# Patient Record
Sex: FEMALE | Race: WHITE | Employment: OTHER | URBAN - METROPOLITAN AREA
[De-identification: names, ages, dates, MRNs, and addresses within clinical notes are randomized per-mention and may not be internally consistent; named-entity substitution may affect disease eponyms.]

---

## 2020-01-08 ENCOUNTER — APPOINTMENT (OUTPATIENT)
Dept: GENERAL RADIOLOGY | Age: 50
End: 2020-01-08
Attending: PHYSICIAN ASSISTANT
Payer: COMMERCIAL

## 2020-01-08 ENCOUNTER — HOSPITAL ENCOUNTER (OUTPATIENT)
Age: 50
Discharge: HOME | End: 2020-01-08
Payer: COMMERCIAL

## 2020-01-08 VITALS
TEMPERATURE: 98 F | WEIGHT: 194 LBS | OXYGEN SATURATION: 99 % | SYSTOLIC BLOOD PRESSURE: 140 MMHG | HEART RATE: 79 BPM | DIASTOLIC BLOOD PRESSURE: 80 MMHG | BODY MASS INDEX: 29.4 KG/M2 | HEIGHT: 68 IN | RESPIRATION RATE: 18 BRPM

## 2020-01-08 DIAGNOSIS — M94.0 COSTOCHONDRITIS, ACUTE: Primary | ICD-10-CM

## 2020-01-08 DIAGNOSIS — R07.81 RIB PAIN ON RIGHT SIDE: ICD-10-CM

## 2020-01-08 PROCEDURE — 99204 OFFICE O/P NEW MOD 45 MIN: CPT

## 2020-01-08 PROCEDURE — 71101 X-RAY EXAM UNILAT RIBS/CHEST: CPT | Performed by: PHYSICIAN ASSISTANT

## 2020-01-08 PROCEDURE — 96372 THER/PROPH/DIAG INJ SC/IM: CPT

## 2020-01-08 RX ORDER — HYDROXYCHLOROQUINE SULFATE 200 MG/1
200 TABLET, FILM COATED ORAL 2 TIMES DAILY
COMMUNITY
End: 2020-02-17

## 2020-01-08 RX ORDER — ESTRADIOL AND NORETHINDRONE ACETATE 1; .5 MG/1; MG/1
1 TABLET ORAL DAILY
COMMUNITY
End: 2020-05-27

## 2020-01-08 RX ORDER — HYDROCODONE BITARTRATE AND ACETAMINOPHEN 5; 325 MG/1; MG/1
1-2 TABLET ORAL EVERY 6 HOURS PRN
Qty: 10 TABLET | Refills: 0 | Status: SHIPPED | OUTPATIENT
Start: 2020-01-08 | End: 2020-01-15

## 2020-01-08 RX ORDER — METHOTREXATE 2.5 MG/1
25 TABLET ORAL WEEKLY
COMMUNITY
End: 2020-02-17

## 2020-01-08 RX ORDER — FOLIC ACID 1 MG/1
TABLET ORAL DAILY
COMMUNITY
End: 2020-11-02

## 2020-01-08 RX ORDER — KETOROLAC TROMETHAMINE 30 MG/ML
60 INJECTION, SOLUTION INTRAMUSCULAR; INTRAVENOUS ONCE
Status: COMPLETED | OUTPATIENT
Start: 2020-01-08 | End: 2020-01-08

## 2020-01-08 RX ORDER — MULTIVIT-MIN/IRON/FOLIC ACID/K 18-600-40
CAPSULE ORAL DAILY
COMMUNITY

## 2020-01-08 RX ORDER — NORTRIPTYLINE HYDROCHLORIDE 10 MG/1
10 CAPSULE ORAL NIGHTLY
COMMUNITY
End: 2020-01-23

## 2020-01-08 RX ORDER — DEXAMETHASONE SODIUM PHOSPHATE 4 MG/ML
16 VIAL (ML) INJECTION ONCE
Status: COMPLETED | OUTPATIENT
Start: 2020-01-08 | End: 2020-01-08

## 2020-01-08 RX ORDER — GABAPENTIN 300 MG/1
1200 CAPSULE ORAL NIGHTLY
COMMUNITY
End: 2020-01-23

## 2020-01-08 RX ORDER — METHYLPREDNISOLONE 4 MG/1
TABLET ORAL
Qty: 1 PACKAGE | Refills: 0 | Status: SHIPPED | OUTPATIENT
Start: 2020-01-08 | End: 2020-01-23 | Stop reason: ALTCHOICE

## 2020-01-08 RX ORDER — LEVOTHYROXINE SODIUM 112 UG/1
112 TABLET ORAL
COMMUNITY
End: 2020-05-27

## 2020-01-08 RX ORDER — AZATHIOPRINE 75 MG/1
75 TABLET ORAL DAILY
COMMUNITY
End: 2020-02-17

## 2020-01-08 RX ORDER — GABAPENTIN 300 MG/1
900 CAPSULE ORAL 2 TIMES DAILY
COMMUNITY
End: 2020-01-23

## 2020-01-08 NOTE — ED PROVIDER NOTES
Patient Seen in: [facility]      History   Patient presents with:  Pain    Stated Complaint: Front rib area pain 1 wk    HPI    50-year-old female here with complaint of tenderness palpation to the right rib cage is been on and Vitals signs and nursing note reviewed. Exam conducted with a chaperone present. Constitutional:       Appearance: She is well-developed. HENT:      Head: Normocephalic.       Right Ear: Tympanic membrane and external ear normal.      Left Ear: Tympanic CONCLUSION:  No evidence of acute displaced right rib fracture.     Dictated by: Akbar Cornejo MD on 1/08/2020 at 18:36     Approved by: Akbar Cornejo MD on 1/08/2020 at 18:36           NOTE: Patient has exquisite tenderness to palpation possibly due to

## 2020-01-23 ENCOUNTER — OFFICE VISIT (OUTPATIENT)
Dept: RHEUMATOLOGY | Facility: CLINIC | Age: 50
End: 2020-01-23
Payer: COMMERCIAL

## 2020-01-23 VITALS
RESPIRATION RATE: 18 BRPM | WEIGHT: 194 LBS | DIASTOLIC BLOOD PRESSURE: 80 MMHG | HEART RATE: 84 BPM | BODY MASS INDEX: 29.4 KG/M2 | HEIGHT: 68 IN | SYSTOLIC BLOOD PRESSURE: 138 MMHG

## 2020-01-23 DIAGNOSIS — M32.19 SYSTEMIC LUPUS ERYTHEMATOSUS WITH OTHER ORGAN INVOLVEMENT, UNSPECIFIED SLE TYPE (HCC): Primary | ICD-10-CM

## 2020-01-23 DIAGNOSIS — M35.01 SJOGREN'S SYNDROME WITH KERATOCONJUNCTIVITIS SICCA (HCC): ICD-10-CM

## 2020-01-23 DIAGNOSIS — M79.7 FIBROMYALGIA: ICD-10-CM

## 2020-01-23 DIAGNOSIS — E03.9 ACQUIRED HYPOTHYROIDISM: ICD-10-CM

## 2020-01-23 DIAGNOSIS — E89.0 S/P THYROIDECTOMY: ICD-10-CM

## 2020-01-23 PROBLEM — M32.9 LUPUS (SYSTEMIC LUPUS ERYTHEMATOSUS) (HCC): Status: ACTIVE | Noted: 2020-01-23

## 2020-01-23 PROBLEM — Z90.89 S/P THYROIDECTOMY: Status: ACTIVE | Noted: 2020-01-23

## 2020-01-23 PROBLEM — Z90.13 S/P BILATERAL MASTECTOMY: Status: ACTIVE | Noted: 2020-01-23

## 2020-01-23 PROBLEM — Z98.890 S/P THYROIDECTOMY: Status: ACTIVE | Noted: 2020-01-23

## 2020-01-23 PROCEDURE — 99205 OFFICE O/P NEW HI 60 MIN: CPT | Performed by: INTERNAL MEDICINE

## 2020-01-23 RX ORDER — NORTRIPTYLINE HYDROCHLORIDE 10 MG/1
10 CAPSULE ORAL NIGHTLY
Qty: 90 CAPSULE | Refills: 3 | Status: SHIPPED | OUTPATIENT
Start: 2020-01-23 | End: 2020-05-27

## 2020-01-23 RX ORDER — GABAPENTIN 300 MG/1
CAPSULE ORAL
Qty: 900 CAPSULE | Refills: 3 | Status: SHIPPED | OUTPATIENT
Start: 2020-01-23 | End: 2020-11-02

## 2020-01-23 NOTE — PATIENT INSTRUCTIONS
Current advice do lab tests including routine lab tests CBC and chemistry profile, but specific lupus tests to check to see how active your lupus is. If the lupus is active and labs and will have to adjust your lupus medication.   However if the lupus test

## 2020-01-24 ENCOUNTER — LAB ENCOUNTER (OUTPATIENT)
Dept: LAB | Age: 50
End: 2020-01-24
Attending: INTERNAL MEDICINE
Payer: COMMERCIAL

## 2020-01-24 ENCOUNTER — OFFICE VISIT (OUTPATIENT)
Dept: INTERNAL MEDICINE CLINIC | Facility: CLINIC | Age: 50
End: 2020-01-24
Payer: COMMERCIAL

## 2020-01-24 VITALS
DIASTOLIC BLOOD PRESSURE: 62 MMHG | HEIGHT: 67.5 IN | OXYGEN SATURATION: 99 % | TEMPERATURE: 98 F | WEIGHT: 194 LBS | BODY MASS INDEX: 30.09 KG/M2 | HEART RATE: 82 BPM | RESPIRATION RATE: 16 BRPM | SYSTOLIC BLOOD PRESSURE: 110 MMHG

## 2020-01-24 DIAGNOSIS — Z15.02 BRCA GENE MUTATION POSITIVE IN FEMALE: ICD-10-CM

## 2020-01-24 DIAGNOSIS — Z79.890 MENOPAUSAL SYNDROME ON HORMONE REPLACEMENT THERAPY: ICD-10-CM

## 2020-01-24 DIAGNOSIS — R22.32 LUMP IN ARMPIT, LEFT: ICD-10-CM

## 2020-01-24 DIAGNOSIS — Z15.09 BRCA GENE MUTATION POSITIVE IN FEMALE: ICD-10-CM

## 2020-01-24 DIAGNOSIS — Z15.01 BRCA GENE MUTATION POSITIVE IN FEMALE: ICD-10-CM

## 2020-01-24 DIAGNOSIS — E03.9 ACQUIRED HYPOTHYROIDISM: ICD-10-CM

## 2020-01-24 DIAGNOSIS — M32.8 OTHER FORMS OF SYSTEMIC LUPUS ERYTHEMATOSUS, UNSPECIFIED ORGAN INVOLVEMENT STATUS (HCC): Primary | ICD-10-CM

## 2020-01-24 DIAGNOSIS — I43 DILATED CARDIOMYOPATHY SECONDARY TO SYSTEMIC LUPUS ERYTHEMATOSUS (HCC): Primary | ICD-10-CM

## 2020-01-24 DIAGNOSIS — Z90.13 S/P BILATERAL MASTECTOMY: ICD-10-CM

## 2020-01-24 DIAGNOSIS — M32.19 SYSTEMIC LUPUS ERYTHEMATOSUS WITH OTHER ORGAN INVOLVEMENT, UNSPECIFIED SLE TYPE (HCC): ICD-10-CM

## 2020-01-24 DIAGNOSIS — M79.7 FIBROMYALGIA: ICD-10-CM

## 2020-01-24 DIAGNOSIS — M32.19 DILATED CARDIOMYOPATHY SECONDARY TO SYSTEMIC LUPUS ERYTHEMATOSUS (HCC): Primary | ICD-10-CM

## 2020-01-24 DIAGNOSIS — N95.1 MENOPAUSAL SYNDROME ON HORMONE REPLACEMENT THERAPY: ICD-10-CM

## 2020-01-24 LAB
ALBUMIN SERPL-MCNC: 3.7 G/DL (ref 3.4–5)
ALBUMIN/GLOB SERPL: 1.2 {RATIO} (ref 1–2)
ALP LIVER SERPL-CCNC: 64 U/L (ref 39–100)
ALT SERPL-CCNC: 24 U/L (ref 13–56)
ANION GAP SERPL CALC-SCNC: 3 MMOL/L (ref 0–18)
AST SERPL-CCNC: 21 U/L (ref 15–37)
BASOPHILS # BLD AUTO: 0.04 X10(3) UL (ref 0–0.2)
BASOPHILS NFR BLD AUTO: 1.5 %
BILIRUB SERPL-MCNC: 0.7 MG/DL (ref 0.1–2)
BUN BLD-MCNC: 11 MG/DL (ref 7–18)
BUN/CREAT SERPL: 12.8 (ref 10–20)
C4 SERPL-MCNC: 23 MG/DL (ref 10–40)
CALCIUM BLD-MCNC: 8.5 MG/DL (ref 8.5–10.1)
CHLORIDE SERPL-SCNC: 113 MMOL/L (ref 98–112)
CHOLEST SMN-MCNC: 195 MG/DL (ref ?–200)
CO2 SERPL-SCNC: 27 MMOL/L (ref 21–32)
CREAT BLD-MCNC: 0.86 MG/DL (ref 0.55–1.02)
CRP SERPL-MCNC: <0.29 MG/DL (ref ?–0.3)
DEPRECATED RDW RBC AUTO: 51 FL (ref 35.1–46.3)
EOSINOPHIL # BLD AUTO: 0.12 X10(3) UL (ref 0–0.7)
EOSINOPHIL NFR BLD AUTO: 4.4 %
ERYTHROCYTE [DISTWIDTH] IN BLOOD BY AUTOMATED COUNT: 13.3 % (ref 11–15)
GLOBULIN PLAS-MCNC: 3.2 G/DL (ref 2.8–4.4)
GLUCOSE BLD-MCNC: 85 MG/DL (ref 70–99)
HCT VFR BLD AUTO: 37.2 % (ref 35–48)
HDLC SERPL-MCNC: 72 MG/DL (ref 40–59)
HGB BLD-MCNC: 11.9 G/DL (ref 12–16)
IMM GRANULOCYTES # BLD AUTO: 0 X10(3) UL (ref 0–1)
IMM GRANULOCYTES NFR BLD: 0 %
LDLC SERPL CALC-MCNC: 108 MG/DL (ref ?–100)
LYMPHOCYTES # BLD AUTO: 1.29 X10(3) UL (ref 1–4)
LYMPHOCYTES NFR BLD AUTO: 47.6 %
M PROTEIN MFR SERPL ELPH: 6.9 G/DL (ref 6.4–8.2)
MCH RBC QN AUTO: 33.1 PG (ref 26–34)
MCHC RBC AUTO-ENTMCNC: 32 G/DL (ref 31–37)
MCV RBC AUTO: 103.6 FL (ref 80–100)
MONOCYTES # BLD AUTO: 0.31 X10(3) UL (ref 0.1–1)
MONOCYTES NFR BLD AUTO: 11.4 %
NEUTROPHILS # BLD AUTO: 0.95 X10 (3) UL (ref 1.5–7.7)
NEUTROPHILS # BLD AUTO: 0.95 X10(3) UL (ref 1.5–7.7)
NEUTROPHILS NFR BLD AUTO: 35.1 %
NONHDLC SERPL-MCNC: 123 MG/DL (ref ?–130)
OSMOLALITY SERPL CALC.SUM OF ELEC: 295 MOSM/KG (ref 275–295)
PATIENT FASTING Y/N/NP: YES
PATIENT FASTING Y/N/NP: YES
PLATELET # BLD AUTO: 157 10(3)UL (ref 150–450)
POTASSIUM SERPL-SCNC: 4.2 MMOL/L (ref 3.5–5.1)
RBC # BLD AUTO: 3.59 X10(6)UL (ref 3.8–5.3)
SED RATE-ML: 11 MM/HR (ref 0–25)
SODIUM SERPL-SCNC: 143 MMOL/L (ref 136–145)
T4 FREE SERPL-MCNC: 1.3 NG/DL (ref 0.8–1.7)
TRIGL SERPL-MCNC: 75 MG/DL (ref 30–149)
TSI SER-ACNC: 3.04 MIU/ML (ref 0.36–3.74)
VLDLC SERPL CALC-MCNC: 15 MG/DL (ref 0–30)
WBC # BLD AUTO: 2.7 X10(3) UL (ref 4–11)

## 2020-01-24 PROCEDURE — 86038 ANTINUCLEAR ANTIBODIES: CPT | Performed by: INTERNAL MEDICINE

## 2020-01-24 PROCEDURE — 80061 LIPID PANEL: CPT | Performed by: INTERNAL MEDICINE

## 2020-01-24 PROCEDURE — 86235 NUCLEAR ANTIGEN ANTIBODY: CPT | Performed by: INTERNAL MEDICINE

## 2020-01-24 PROCEDURE — 99204 OFFICE O/P NEW MOD 45 MIN: CPT | Performed by: INTERNAL MEDICINE

## 2020-01-24 PROCEDURE — 85652 RBC SED RATE AUTOMATED: CPT | Performed by: INTERNAL MEDICINE

## 2020-01-24 PROCEDURE — 86140 C-REACTIVE PROTEIN: CPT | Performed by: INTERNAL MEDICINE

## 2020-01-24 PROCEDURE — 36415 COLL VENOUS BLD VENIPUNCTURE: CPT | Performed by: INTERNAL MEDICINE

## 2020-01-24 PROCEDURE — 84439 ASSAY OF FREE THYROXINE: CPT | Performed by: INTERNAL MEDICINE

## 2020-01-24 PROCEDURE — 80050 GENERAL HEALTH PANEL: CPT | Performed by: INTERNAL MEDICINE

## 2020-01-24 PROCEDURE — 86225 DNA ANTIBODY NATIVE: CPT | Performed by: INTERNAL MEDICINE

## 2020-01-24 PROCEDURE — 86160 COMPLEMENT ANTIGEN: CPT | Performed by: INTERNAL MEDICINE

## 2020-01-24 NOTE — PATIENT INSTRUCTIONS
If you are going to be on naproxen every day, then you need to start omeprazole, pantoprazole, or esomeprazole 30 minutes prior to breakfast every day to protect your stomach.

## 2020-01-24 NOTE — PROGRESS NOTES
Lauren Mares is a 47year old female. HPI:   Patient presents for the following issues. Moved here from [place]. 1. Fibromyalgia: on gabapentin and notryptiline but has joint pain every day. Not sure how much medication is helping.   2. Migraines: has OTHER (SEE COMMENTS)    Comment:Blister  Ultram [Tramadol]       ITCHING    No family history on file.    Past Medical History:   Diagnosis Date   • Fibromyalgia    • Fibromyalgia    • Lupus ([facility]    • Lupus ([facility]    • Sjogren's syndrome ([facility]    • Sjogr cont levothyroxine. TSH ordered  # Lupus: she has established with rheumatology, Dr. Ankita Amador. She is currently on HCQ, MTX, and azathioprine. # Pain: not sure if she is having lupus flare vs fibromyalgia worsening. Labs are processing for further advice.

## 2020-01-25 ENCOUNTER — HOSPITAL ENCOUNTER (OUTPATIENT)
Dept: ULTRASOUND IMAGING | Age: 50
Discharge: HOME | End: 2020-01-25
Attending: INTERNAL MEDICINE
Payer: COMMERCIAL

## 2020-01-25 DIAGNOSIS — R22.32 LUMP IN ARMPIT, LEFT: ICD-10-CM

## 2020-01-25 PROCEDURE — 76882 US LMTD JT/FCL EVL NVASC XTR: CPT | Performed by: INTERNAL MEDICINE

## 2020-01-27 LAB
ANA SCREEN: POSITIVE
CENTROMERE AUTOAB: <100 AU/ML (ref ?–100)
DSDNA AUTOAB: <100 IU/ML (ref ?–100)
HISTONE AUTOAB: <100 AU/ML (ref ?–100)
JO-1 AUTOAB: <100 AU/ML (ref ?–100)
RNP AUTOAB: <100 AU/ML (ref ?–100)
SCL-70 AUTOAB: <100 AU/ML (ref ?–100)
SM AUTOAB (SMITH): <100 AU/ML (ref ?–100)
SSA AUTOAB: 166 AU/ML (ref ?–100)
SSB AUTOAB: <100 AU/ML (ref ?–100)

## 2020-05-20 ENCOUNTER — LAB ENCOUNTER (OUTPATIENT)
Dept: LAB | Age: 50
End: 2020-05-20
Attending: INTERNAL MEDICINE
Payer: COMMERCIAL

## 2020-05-20 DIAGNOSIS — M35.01 SJOGREN'S SYNDROME WITH KERATOCONJUNCTIVITIS SICCA (HCC): ICD-10-CM

## 2020-05-20 DIAGNOSIS — M32.19 SYSTEMIC LUPUS ERYTHEMATOSUS WITH OTHER ORGAN INVOLVEMENT, UNSPECIFIED SLE TYPE (HCC): ICD-10-CM

## 2020-05-20 DIAGNOSIS — R79.89 ABNORMAL CBC: ICD-10-CM

## 2020-05-20 DIAGNOSIS — M79.7 FIBROMYALGIA: ICD-10-CM

## 2020-05-20 DIAGNOSIS — Z79.899 LONG-TERM USE OF HIGH-RISK MEDICATION: ICD-10-CM

## 2020-05-20 PROCEDURE — 80076 HEPATIC FUNCTION PANEL: CPT

## 2020-05-20 PROCEDURE — 81003 URINALYSIS AUTO W/O SCOPE: CPT

## 2020-05-20 PROCEDURE — 82565 ASSAY OF CREATININE: CPT

## 2020-05-20 PROCEDURE — 86160 COMPLEMENT ANTIGEN: CPT

## 2020-05-20 PROCEDURE — 85025 COMPLETE CBC W/AUTO DIFF WBC: CPT

## 2020-05-20 PROCEDURE — 86256 FLUORESCENT ANTIBODY TITER: CPT

## 2020-05-20 PROCEDURE — 84550 ASSAY OF BLOOD/URIC ACID: CPT

## 2020-05-20 PROCEDURE — 85652 RBC SED RATE AUTOMATED: CPT

## 2020-05-20 PROCEDURE — 86140 C-REACTIVE PROTEIN: CPT

## 2020-05-20 PROCEDURE — 84520 ASSAY OF UREA NITROGEN: CPT

## 2020-05-20 PROCEDURE — 82550 ASSAY OF CK (CPK): CPT

## 2020-05-20 PROCEDURE — 36415 COLL VENOUS BLD VENIPUNCTURE: CPT

## 2020-05-27 ENCOUNTER — OFFICE VISIT (OUTPATIENT)
Dept: INTERNAL MEDICINE CLINIC | Facility: CLINIC | Age: 50
End: 2020-05-27
Payer: COMMERCIAL

## 2020-05-27 VITALS
SYSTOLIC BLOOD PRESSURE: 106 MMHG | HEART RATE: 78 BPM | RESPIRATION RATE: 16 BRPM | BODY MASS INDEX: 28.64 KG/M2 | WEIGHT: 189 LBS | TEMPERATURE: 99 F | HEIGHT: 68 IN | DIASTOLIC BLOOD PRESSURE: 74 MMHG

## 2020-05-27 DIAGNOSIS — Z15.02 BRCA GENE MUTATION POSITIVE IN FEMALE: ICD-10-CM

## 2020-05-27 DIAGNOSIS — N95.1 MENOPAUSAL SYNDROME ON HORMONE REPLACEMENT THERAPY: ICD-10-CM

## 2020-05-27 DIAGNOSIS — Z90.13 S/P BILATERAL MASTECTOMY: ICD-10-CM

## 2020-05-27 DIAGNOSIS — Z12.11 SCREEN FOR COLON CANCER: ICD-10-CM

## 2020-05-27 DIAGNOSIS — Z15.09 BRCA GENE MUTATION POSITIVE IN FEMALE: ICD-10-CM

## 2020-05-27 DIAGNOSIS — Z79.890 MENOPAUSAL SYNDROME ON HORMONE REPLACEMENT THERAPY: ICD-10-CM

## 2020-05-27 DIAGNOSIS — G43.709 CHRONIC MIGRAINE WITHOUT AURA WITHOUT STATUS MIGRAINOSUS, NOT INTRACTABLE: ICD-10-CM

## 2020-05-27 DIAGNOSIS — M32.8 OTHER FORMS OF SYSTEMIC LUPUS ERYTHEMATOSUS, UNSPECIFIED ORGAN INVOLVEMENT STATUS (HCC): ICD-10-CM

## 2020-05-27 DIAGNOSIS — Z12.83 SCREENING EXAM FOR SKIN CANCER: ICD-10-CM

## 2020-05-27 DIAGNOSIS — E03.9 ACQUIRED HYPOTHYROIDISM: ICD-10-CM

## 2020-05-27 DIAGNOSIS — Z00.00 ROUTINE GENERAL MEDICAL EXAMINATION AT A HEALTH CARE FACILITY: Primary | ICD-10-CM

## 2020-05-27 DIAGNOSIS — Z15.01 BRCA GENE MUTATION POSITIVE IN FEMALE: ICD-10-CM

## 2020-05-27 DIAGNOSIS — M79.7 FIBROMYALGIA: ICD-10-CM

## 2020-05-27 PROCEDURE — 90472 IMMUNIZATION ADMIN EACH ADD: CPT | Performed by: INTERNAL MEDICINE

## 2020-05-27 PROCEDURE — 88175 CYTOPATH C/V AUTO FLUID REDO: CPT | Performed by: INTERNAL MEDICINE

## 2020-05-27 PROCEDURE — 90732 PPSV23 VACC 2 YRS+ SUBQ/IM: CPT | Performed by: INTERNAL MEDICINE

## 2020-05-27 PROCEDURE — 90750 HZV VACC RECOMBINANT IM: CPT | Performed by: INTERNAL MEDICINE

## 2020-05-27 PROCEDURE — 99214 OFFICE O/P EST MOD 30 MIN: CPT | Performed by: INTERNAL MEDICINE

## 2020-05-27 PROCEDURE — 99396 PREV VISIT EST AGE 40-64: CPT | Performed by: INTERNAL MEDICINE

## 2020-05-27 PROCEDURE — 90471 IMMUNIZATION ADMIN: CPT | Performed by: INTERNAL MEDICINE

## 2020-05-27 PROCEDURE — 90715 TDAP VACCINE 7 YRS/> IM: CPT | Performed by: INTERNAL MEDICINE

## 2020-05-27 RX ORDER — ESTRADIOL AND NORETHINDRONE ACETATE .5; .1 MG/1; MG/1
1 TABLET ORAL DAILY
Qty: 90 TABLET | Refills: 1 | Status: SHIPPED | OUTPATIENT
Start: 2020-05-27 | End: 2020-11-06

## 2020-05-27 RX ORDER — LEVOTHYROXINE SODIUM 112 UG/1
112 TABLET ORAL
Qty: 90 TABLET | Refills: 2 | Status: SHIPPED | OUTPATIENT
Start: 2020-05-27 | End: 2020-05-27

## 2020-05-27 RX ORDER — LEVOTHYROXINE SODIUM 112 UG/1
112 TABLET ORAL
Qty: 90 TABLET | Refills: 2 | Status: SHIPPED | OUTPATIENT
Start: 2020-05-27 | End: 2020-10-30

## 2020-05-27 RX ORDER — ESTRADIOL AND NORETHINDRONE ACETATE .5; .1 MG/1; MG/1
1 TABLET ORAL DAILY
COMMUNITY
End: 2020-05-27

## 2020-05-27 NOTE — PATIENT INSTRUCTIONS
You are due for routine labs in late July, 2020 for Dr. Nisha Retana    You need 3 eight ounce servings of calcium/vitamin D daily. This includes spinach, kale, broccoli, almonds, milk, yogurt, or cottage cheese.

## 2020-05-28 ENCOUNTER — TELEPHONE (OUTPATIENT)
Dept: INTERNAL MEDICINE CLINIC | Facility: CLINIC | Age: 50
End: 2020-05-28

## 2020-05-28 NOTE — TELEPHONE ENCOUNTER
TY. Though it is rare, her body's immune system might be responding to the vaccines as it boosts itself. Symptoms can last 48-72 hours.  Appropriate to use tylenol/ibuprofen and rest.

## 2020-05-28 NOTE — TELEPHONE ENCOUNTER
Pt received 3 vaccines yesterday. Pneumovax 23, TDAP, shingrix and believes she is having reaction to vaccines. States that last night her temp was 99.2 prior to bed, she woke up in the middle of the night to temp of 102 and then this AM temp of 99.6.

## 2020-06-11 ENCOUNTER — GENETICS ENCOUNTER (OUTPATIENT)
Dept: GENETICS | Facility: HOSPITAL | Age: 50
End: 2020-06-11
Attending: GENETIC COUNSELOR, MS
Payer: COMMERCIAL

## 2020-06-11 DIAGNOSIS — Z15.09 BRCA GENE MUTATION POSITIVE IN FEMALE: ICD-10-CM

## 2020-06-11 DIAGNOSIS — Z15.01 BRCA GENE MUTATION POSITIVE IN FEMALE: ICD-10-CM

## 2020-06-11 DIAGNOSIS — Z15.02 BRCA GENE MUTATION POSITIVE IN FEMALE: ICD-10-CM

## 2020-06-11 PROCEDURE — 96040 HC GENETIC COUNSELING EA 30 MIN: CPT | Performed by: GENETIC COUNSELOR, MS

## 2020-06-12 NOTE — PROGRESS NOTES
Referring Provider:  Marek Coburn MD    Additional Provider(s):  Aleta Johnson MD    Reason for Referral:  Jodi Nyhan was referred for genetic counseling because she is a known carrier of a BRCA1 pathogenic variant.   Ms. Nannette Fontanez is a 47year-old woman of Po cancer in her mid-50s and  from “bone” cancer at age 75. The daughter of this aunt  at age 52 from ovarian cancer. A second maternal aunt, Thien Sadler, had breast cancer at age 39. Thien Sadler has one son and three daughters.   One of Fred’s daughters had b y.  • Clinical breast exam, every 6-12 mo, starting at age 22 y. • Breast screening  o Age 20-27 y, annual breast MRI screening with contrast or individualized based on earliest age of onset in family if a breast cancer diagnosis before age 27 is present. surgery. • Although of uncertain benefit, for those patients who have not elected risk-reducing salpingo-oophorectomy, consider concurrent transvaginal ultrasound with serum CA-125, every 6 mo starting at age 32-31 at the clinician's discretion.     • Con Guidelines.  Reasons for young men to consider testing for a familial BRCA1 pathogenic variant prior to age 28 include reproductive planning (e.g, exploration of IVF/PGD for individuals who wish to reduce the chance to pass a pathogenic variant on to their and locate a copy of her genetic test report from 2014 and forward that to me for review and to include in her medical record with ALIZA and PHOEBEG.       Medical recommendations for individuals with BRCA1 pathogenic variants as outlined by NCCN Guidelines v1.20

## 2020-08-13 ENCOUNTER — LAB ENCOUNTER (OUTPATIENT)
Dept: LAB | Age: 50
End: 2020-08-13
Attending: INTERNAL MEDICINE
Payer: COMMERCIAL

## 2020-08-13 DIAGNOSIS — M32.19 SYSTEMIC LUPUS ERYTHEMATOSUS WITH OTHER ORGAN INVOLVEMENT, UNSPECIFIED SLE TYPE (HCC): ICD-10-CM

## 2020-08-13 DIAGNOSIS — Z00.00 ROUTINE GENERAL MEDICAL EXAMINATION AT A HEALTH CARE FACILITY: ICD-10-CM

## 2020-08-13 DIAGNOSIS — Z12.83 SCREENING EXAM FOR SKIN CANCER: ICD-10-CM

## 2020-08-13 DIAGNOSIS — M32.8 OTHER FORMS OF SYSTEMIC LUPUS ERYTHEMATOSUS, UNSPECIFIED ORGAN INVOLVEMENT STATUS (HCC): ICD-10-CM

## 2020-08-13 DIAGNOSIS — D53.9 MACROCYTIC ANEMIA: Primary | ICD-10-CM

## 2020-08-13 DIAGNOSIS — D53.9 MACROCYTIC ANEMIA: ICD-10-CM

## 2020-08-13 LAB
ALBUMIN SERPL-MCNC: 3.8 G/DL (ref 3.4–5)
ALBUMIN/GLOB SERPL: 1.2 {RATIO} (ref 1–2)
ALP LIVER SERPL-CCNC: 80 U/L (ref 39–100)
ALT SERPL-CCNC: 31 U/L (ref 13–56)
ANION GAP SERPL CALC-SCNC: 2 MMOL/L (ref 0–18)
AST SERPL-CCNC: 21 U/L (ref 15–37)
BASOPHILS # BLD AUTO: 0.02 X10(3) UL (ref 0–0.2)
BASOPHILS NFR BLD AUTO: 0.9 %
BILIRUB DIRECT SERPL-MCNC: <0.1 MG/DL (ref 0–0.2)
BILIRUB SERPL-MCNC: 0.4 MG/DL (ref 0.1–2)
BUN BLD-MCNC: 11 MG/DL (ref 7–18)
BUN/CREAT SERPL: 11.5 (ref 10–20)
CALCIUM BLD-MCNC: 8.6 MG/DL (ref 8.5–10.1)
CHLORIDE SERPL-SCNC: 108 MMOL/L (ref 98–112)
CK SERPL-CCNC: 117 U/L (ref 26–192)
CO2 SERPL-SCNC: 31 MMOL/L (ref 21–32)
CREAT BLD-MCNC: 0.96 MG/DL (ref 0.55–1.02)
CRP SERPL-MCNC: <0.29 MG/DL (ref ?–0.3)
DEPRECATED HBV CORE AB SER IA-ACNC: 78.3 NG/ML (ref 18–340)
DEPRECATED RDW RBC AUTO: 49.5 FL (ref 35.1–46.3)
EOSINOPHIL # BLD AUTO: 0.11 X10(3) UL (ref 0–0.7)
EOSINOPHIL NFR BLD AUTO: 5.1 %
ERYTHROCYTE [DISTWIDTH] IN BLOOD BY AUTOMATED COUNT: 13 % (ref 11–15)
GLOBULIN PLAS-MCNC: 3.1 G/DL (ref 2.8–4.4)
GLUCOSE BLD-MCNC: 80 MG/DL (ref 70–99)
HCT VFR BLD AUTO: 35.1 % (ref 35–48)
HGB BLD-MCNC: 11.6 G/DL (ref 12–16)
IMM GRANULOCYTES # BLD AUTO: 0.01 X10(3) UL (ref 0–1)
IMM GRANULOCYTES NFR BLD: 0.5 %
IRON SATURATION: 47 % (ref 15–50)
IRON SERPL-MCNC: 160 UG/DL (ref 50–170)
LYMPHOCYTES # BLD AUTO: 0.81 X10(3) UL (ref 1–4)
LYMPHOCYTES NFR BLD AUTO: 37.3 %
M PROTEIN MFR SERPL ELPH: 6.9 G/DL (ref 6.4–8.2)
MCH RBC QN AUTO: 34.9 PG (ref 26–34)
MCHC RBC AUTO-ENTMCNC: 33 G/DL (ref 31–37)
MCV RBC AUTO: 105.7 FL (ref 80–100)
MONOCYTES # BLD AUTO: 0.21 X10(3) UL (ref 0.1–1)
MONOCYTES NFR BLD AUTO: 9.7 %
NEUTROPHILS # BLD AUTO: 1.01 X10 (3) UL (ref 1.5–7.7)
NEUTROPHILS # BLD AUTO: 1.01 X10(3) UL (ref 1.5–7.7)
NEUTROPHILS NFR BLD AUTO: 46.5 %
OSMOLALITY SERPL CALC.SUM OF ELEC: 290 MOSM/KG (ref 275–295)
PATIENT FASTING Y/N/NP: YES
PLATELET # BLD AUTO: 194 10(3)UL (ref 150–450)
POTASSIUM SERPL-SCNC: 3.7 MMOL/L (ref 3.5–5.1)
RBC # BLD AUTO: 3.32 X10(6)UL (ref 3.8–5.3)
SED RATE-ML: 30 MM/HR (ref 0–25)
SODIUM SERPL-SCNC: 141 MMOL/L (ref 136–145)
TOTAL IRON BINDING CAPACITY: 343 UG/DL (ref 240–450)
TRANSFERRIN SERPL-MCNC: 230 MG/DL (ref 200–360)
URATE SERPL-MCNC: 3.3 MG/DL (ref 2.6–6)
VIT B12 SERPL-MCNC: 394 PG/ML (ref 193–986)
VIT D+METAB SERPL-MCNC: 53.6 NG/ML (ref 30–100)
WBC # BLD AUTO: 2.2 X10(3) UL (ref 4–11)

## 2020-08-13 PROCEDURE — 83550 IRON BINDING TEST: CPT | Performed by: INTERNAL MEDICINE

## 2020-08-13 PROCEDURE — 82607 VITAMIN B-12: CPT | Performed by: INTERNAL MEDICINE

## 2020-08-13 PROCEDURE — 80053 COMPREHEN METABOLIC PANEL: CPT | Performed by: INTERNAL MEDICINE

## 2020-08-13 PROCEDURE — 82728 ASSAY OF FERRITIN: CPT | Performed by: INTERNAL MEDICINE

## 2020-08-13 PROCEDURE — 85025 COMPLETE CBC W/AUTO DIFF WBC: CPT | Performed by: INTERNAL MEDICINE

## 2020-08-13 PROCEDURE — 83540 ASSAY OF IRON: CPT | Performed by: INTERNAL MEDICINE

## 2020-09-12 ENCOUNTER — APPOINTMENT (OUTPATIENT)
Dept: LAB | Facility: HOSPITAL | Age: 50
End: 2020-09-12
Attending: INTERNAL MEDICINE
Payer: COMMERCIAL

## 2020-09-12 DIAGNOSIS — Z01.818 PRE-OP TESTING: ICD-10-CM

## 2020-09-13 LAB — SARS-COV-2 RNA RESP QL NAA+PROBE: NOT DETECTED

## 2020-09-15 PROBLEM — D12.2 BENIGN NEOPLASM OF ASCENDING COLON: Status: ACTIVE | Noted: 2020-09-15

## 2020-09-15 PROBLEM — Z83.71 FAMILY HISTORY OF COLONIC POLYPS: Status: ACTIVE | Noted: 2020-09-15

## 2020-09-15 PROBLEM — Z83.719 FAMILY HISTORY OF COLONIC POLYPS: Status: ACTIVE | Noted: 2020-09-15

## 2020-09-15 PROBLEM — Z12.11 SPECIAL SCREENING FOR MALIGNANT NEOPLASMS, COLON: Status: ACTIVE | Noted: 2020-09-15

## 2020-09-23 RX ORDER — GALCANEZUMAB 120 MG/ML
INJECTION, SOLUTION SUBCUTANEOUS
Refills: 2 | OUTPATIENT
Start: 2020-09-23

## 2020-09-24 RX ORDER — GALCANEZUMAB 120 MG/ML
120 INJECTION, SOLUTION SUBCUTANEOUS
Qty: 1 ML | Refills: 2 | Status: SHIPPED | OUTPATIENT
Start: 2020-09-24 | End: 2020-12-22

## 2020-09-24 NOTE — TELEPHONE ENCOUNTER
Refill requested:   Requested Prescriptions     Pending Prescriptions Disp Refills   • EMGALITY 120 MG/ML Subcutaneous Solution Auto-injector [Pharmacy Med Name: EMGALITY 120 MG/ML PEN]  2     Sig: INJECT 120 MG INTO THE SKIN EVERY 30 (THIRTY) DAYS.      Stacie Willett

## 2020-09-28 ENCOUNTER — LAB ENCOUNTER (OUTPATIENT)
Dept: LAB | Age: 50
End: 2020-09-28
Attending: INTERNAL MEDICINE
Payer: COMMERCIAL

## 2020-09-28 DIAGNOSIS — D64.9 NORMOCYTIC ANEMIA: ICD-10-CM

## 2020-09-28 DIAGNOSIS — D53.9 MACROCYTIC ANEMIA: ICD-10-CM

## 2020-09-28 LAB
FOLATE SERPL-MCNC: 17.4 NG/ML (ref 8.7–?)
VIT B12 SERPL-MCNC: 529 PG/ML (ref 193–986)

## 2020-09-28 PROCEDURE — 82746 ASSAY OF FOLIC ACID SERUM: CPT | Performed by: INTERNAL MEDICINE

## 2020-09-28 PROCEDURE — 82607 VITAMIN B-12: CPT | Performed by: INTERNAL MEDICINE

## 2020-10-30 RX ORDER — LEVOTHYROXINE SODIUM 112 UG/1
112 TABLET ORAL
Qty: 90 TABLET | Refills: 0 | Status: SHIPPED | OUTPATIENT
Start: 2020-10-30 | End: 2021-01-22

## 2020-11-06 RX ORDER — ESTRADIOL AND NORETHINDRONE ACETATE .5; .1 MG/1; MG/1
TABLET ORAL
Qty: 84 TABLET | Refills: 0 | Status: SHIPPED | OUTPATIENT
Start: 2020-11-06 | End: 2021-02-23

## 2020-11-08 ENCOUNTER — HOSPITAL ENCOUNTER (OUTPATIENT)
Age: 50
Discharge: HOME | End: 2020-11-08
Payer: COMMERCIAL

## 2020-11-08 VITALS
RESPIRATION RATE: 18 BRPM | HEART RATE: 79 BPM | SYSTOLIC BLOOD PRESSURE: 128 MMHG | DIASTOLIC BLOOD PRESSURE: 84 MMHG | TEMPERATURE: 97 F | OXYGEN SATURATION: 99 %

## 2020-11-08 DIAGNOSIS — Z20.822 EXPOSURE TO COVID-19 VIRUS: Primary | ICD-10-CM

## 2020-11-08 PROCEDURE — 99213 OFFICE O/P EST LOW 20 MIN: CPT | Performed by: NURSE PRACTITIONER

## 2020-11-08 NOTE — ED PROVIDER NOTES
Patient Seen in: Immediate [address] [place] [address]      History   Patient presents with:  Testing    Stated Complaint: covid exposure-sinus drainage,sore throat    43-year-old female presents to the immediate care requesting a COVID-19 test.  States her  was reviewed with patient/caregiver and is not pertinent to presenting problem.     Social History    Tobacco Use      Smoking status: Never Smoker      Smokeless tobacco: Never Used    Alcohol use: Never      Frequency: Never    Drug use: Never             Rev instructions and agrees to the following plan provided.   The patient and/or family was also given written discharge instructions including information regarding today's visit and indications prompting immediate return and appropriate follow-up was given in

## 2020-11-13 ENCOUNTER — LAB ENCOUNTER (OUTPATIENT)
Dept: LAB | Age: 50
End: 2020-11-13
Attending: INTERNAL MEDICINE
Payer: COMMERCIAL

## 2020-11-13 DIAGNOSIS — M35.01 SJOGREN'S SYNDROME WITH KERATOCONJUNCTIVITIS SICCA (HCC): ICD-10-CM

## 2020-11-13 DIAGNOSIS — M79.7 FIBROMYALGIA: ICD-10-CM

## 2020-11-13 DIAGNOSIS — M32.8 OTHER FORMS OF SYSTEMIC LUPUS ERYTHEMATOSUS, UNSPECIFIED ORGAN INVOLVEMENT STATUS (HCC): ICD-10-CM

## 2020-11-13 DIAGNOSIS — R79.89 ABNORMAL CBC: ICD-10-CM

## 2020-11-13 DIAGNOSIS — M32.19 SYSTEMIC LUPUS ERYTHEMATOSUS WITH OTHER ORGAN INVOLVEMENT, UNSPECIFIED SLE TYPE (HCC): ICD-10-CM

## 2020-11-13 PROCEDURE — 36415 COLL VENOUS BLD VENIPUNCTURE: CPT

## 2020-11-13 PROCEDURE — 82565 ASSAY OF CREATININE: CPT

## 2020-11-13 PROCEDURE — 86147 CARDIOLIPIN ANTIBODY EA IG: CPT

## 2020-11-13 PROCEDURE — 85613 RUSSELL VIPER VENOM DILUTED: CPT

## 2020-11-13 PROCEDURE — 84520 ASSAY OF UREA NITROGEN: CPT

## 2020-11-13 PROCEDURE — 85705 THROMBOPLASTIN INHIBITION: CPT

## 2020-11-13 PROCEDURE — 86146 BETA-2 GLYCOPROTEIN ANTIBODY: CPT

## 2020-11-13 PROCEDURE — 85610 PROTHROMBIN TIME: CPT

## 2020-11-13 PROCEDURE — 86256 FLUORESCENT ANTIBODY TITER: CPT

## 2020-11-13 PROCEDURE — 85732 THROMBOPLASTIN TIME PARTIAL: CPT

## 2020-11-13 PROCEDURE — 85652 RBC SED RATE AUTOMATED: CPT

## 2020-11-13 PROCEDURE — 81003 URINALYSIS AUTO W/O SCOPE: CPT

## 2020-11-13 PROCEDURE — 85025 COMPLETE CBC W/AUTO DIFF WBC: CPT

## 2020-11-13 PROCEDURE — 80076 HEPATIC FUNCTION PANEL: CPT

## 2020-11-13 PROCEDURE — 86160 COMPLEMENT ANTIGEN: CPT

## 2020-11-13 PROCEDURE — 82550 ASSAY OF CK (CPK): CPT

## 2020-11-13 PROCEDURE — 86140 C-REACTIVE PROTEIN: CPT

## 2020-12-22 RX ORDER — GALCANEZUMAB 120 MG/ML
120 INJECTION, SOLUTION SUBCUTANEOUS
Qty: 1 PEN | Refills: 2 | Status: SHIPPED | OUTPATIENT
Start: 2020-12-22 | End: 2021-03-22

## 2020-12-22 NOTE — TELEPHONE ENCOUNTER
Medication(s) to Refill:   Requested Prescriptions     Pending Prescriptions Disp Refills   • EMGALITY 120 MG/ML Subcutaneous Solution Auto-injector [Pharmacy Med Name: EMGALITY 120 MG/ML PEN]  2     Sig: INJECT 120 MG INTO THE SKIN EVERY 30 (THIRTY) DAYS.

## 2021-01-22 RX ORDER — LEVOTHYROXINE SODIUM 112 UG/1
112 TABLET ORAL
Qty: 90 TABLET | Refills: 0 | Status: SHIPPED | OUTPATIENT
Start: 2021-01-22 | End: 2021-05-28

## 2021-02-02 ENCOUNTER — LAB ENCOUNTER (OUTPATIENT)
Dept: LAB | Age: 51
End: 2021-02-02
Attending: INTERNAL MEDICINE
Payer: COMMERCIAL

## 2021-02-02 DIAGNOSIS — E03.9 ACQUIRED HYPOTHYROIDISM: ICD-10-CM

## 2021-02-02 DIAGNOSIS — M32.19 SYSTEMIC LUPUS ERYTHEMATOSUS WITH OTHER ORGAN INVOLVEMENT, UNSPECIFIED SLE TYPE (HCC): ICD-10-CM

## 2021-02-02 DIAGNOSIS — Z79.899 LONG-TERM USE OF HIGH-RISK MEDICATION: ICD-10-CM

## 2021-02-02 LAB
ALBUMIN SERPL-MCNC: 3.9 G/DL (ref 3.4–5)
ALP LIVER SERPL-CCNC: 94 U/L
ALT SERPL-CCNC: 38 U/L
AST SERPL-CCNC: 30 U/L (ref 15–37)
BASOPHILS # BLD AUTO: 0.02 X10(3) UL (ref 0–0.2)
BASOPHILS NFR BLD AUTO: 0.7 %
BILIRUB DIRECT SERPL-MCNC: 0.1 MG/DL (ref 0–0.2)
BILIRUB SERPL-MCNC: 0.5 MG/DL (ref 0.1–2)
BUN BLD-MCNC: 14 MG/DL (ref 7–18)
C3 SERPL-MCNC: 122 MG/DL (ref 90–180)
C4 SERPL-MCNC: 25.1 MG/DL (ref 10–40)
CHOLEST SMN-MCNC: 201 MG/DL (ref ?–200)
CREAT BLD-MCNC: 0.95 MG/DL
CRP SERPL-MCNC: <0.29 MG/DL (ref ?–0.3)
DEPRECATED RDW RBC AUTO: 47.2 FL (ref 35.1–46.3)
EOSINOPHIL # BLD AUTO: 0.12 X10(3) UL (ref 0–0.7)
EOSINOPHIL NFR BLD AUTO: 4.3 %
ERYTHROCYTE [DISTWIDTH] IN BLOOD BY AUTOMATED COUNT: 12.5 % (ref 11–15)
HCT VFR BLD AUTO: 35.7 %
HDLC SERPL-MCNC: 75 MG/DL (ref 40–59)
HGB BLD-MCNC: 11.6 G/DL
IMM GRANULOCYTES # BLD AUTO: 0 X10(3) UL (ref 0–1)
IMM GRANULOCYTES NFR BLD: 0 %
LDLC SERPL CALC-MCNC: 113 MG/DL (ref ?–100)
LYMPHOCYTES # BLD AUTO: 0.97 X10(3) UL (ref 1–4)
LYMPHOCYTES NFR BLD AUTO: 35 %
M PROTEIN MFR SERPL ELPH: 6.6 G/DL (ref 6.4–8.2)
MCH RBC QN AUTO: 33.8 PG (ref 26–34)
MCHC RBC AUTO-ENTMCNC: 32.5 G/DL (ref 31–37)
MCV RBC AUTO: 104.1 FL
MONOCYTES # BLD AUTO: 0.35 X10(3) UL (ref 0.1–1)
MONOCYTES NFR BLD AUTO: 12.6 %
NEUTROPHILS # BLD AUTO: 1.31 X10 (3) UL (ref 1.5–7.7)
NEUTROPHILS # BLD AUTO: 1.31 X10(3) UL (ref 1.5–7.7)
NEUTROPHILS NFR BLD AUTO: 47.4 %
NONHDLC SERPL-MCNC: 126 MG/DL (ref ?–130)
PATIENT FASTING Y/N/NP: YES
PLATELET # BLD AUTO: 182 10(3)UL (ref 150–450)
PROT UR-MCNC: 6.2 MG/DL
RBC # BLD AUTO: 3.43 X10(6)UL
SED RATE-ML: 18 MM/HR
T4 FREE SERPL-MCNC: 1.2 NG/DL (ref 0.8–1.7)
TRIGL SERPL-MCNC: 64 MG/DL (ref 30–149)
TSI SER-ACNC: 0.06 MIU/ML (ref 0.36–3.74)
VIT D+METAB SERPL-MCNC: 54.8 NG/ML (ref 30–100)
VLDLC SERPL CALC-MCNC: 13 MG/DL (ref 0–30)
WBC # BLD AUTO: 2.8 X10(3) UL (ref 4–11)

## 2021-02-02 PROCEDURE — 80061 LIPID PANEL: CPT

## 2021-02-02 PROCEDURE — 86140 C-REACTIVE PROTEIN: CPT

## 2021-02-02 PROCEDURE — 84520 ASSAY OF UREA NITROGEN: CPT

## 2021-02-02 PROCEDURE — 86160 COMPLEMENT ANTIGEN: CPT

## 2021-02-02 PROCEDURE — 84439 ASSAY OF FREE THYROXINE: CPT

## 2021-02-02 PROCEDURE — 82565 ASSAY OF CREATININE: CPT

## 2021-02-02 PROCEDURE — 86256 FLUORESCENT ANTIBODY TITER: CPT

## 2021-02-02 PROCEDURE — 84156 ASSAY OF PROTEIN URINE: CPT

## 2021-02-02 PROCEDURE — 82306 VITAMIN D 25 HYDROXY: CPT

## 2021-02-02 PROCEDURE — 80076 HEPATIC FUNCTION PANEL: CPT

## 2021-02-02 PROCEDURE — 84443 ASSAY THYROID STIM HORMONE: CPT

## 2021-02-02 PROCEDURE — 36415 COLL VENOUS BLD VENIPUNCTURE: CPT

## 2021-02-02 PROCEDURE — 85025 COMPLETE CBC W/AUTO DIFF WBC: CPT

## 2021-02-02 PROCEDURE — 85652 RBC SED RATE AUTOMATED: CPT

## 2021-02-03 DIAGNOSIS — E89.0 S/P THYROIDECTOMY: Primary | ICD-10-CM

## 2021-02-03 DIAGNOSIS — E03.9 ACQUIRED HYPOTHYROIDISM: ICD-10-CM

## 2021-02-09 ENCOUNTER — OFFICE VISIT (OUTPATIENT)
Dept: INTERNAL MEDICINE CLINIC | Facility: CLINIC | Age: 51
End: 2021-02-09
Payer: COMMERCIAL

## 2021-02-09 VITALS
BODY MASS INDEX: 30.26 KG/M2 | OXYGEN SATURATION: 99 % | WEIGHT: 192.81 LBS | DIASTOLIC BLOOD PRESSURE: 80 MMHG | SYSTOLIC BLOOD PRESSURE: 115 MMHG | HEIGHT: 67 IN | HEART RATE: 94 BPM | RESPIRATION RATE: 16 BRPM | TEMPERATURE: 99 F

## 2021-02-09 DIAGNOSIS — M32.8 OTHER FORMS OF SYSTEMIC LUPUS ERYTHEMATOSUS, UNSPECIFIED ORGAN INVOLVEMENT STATUS (HCC): ICD-10-CM

## 2021-02-09 DIAGNOSIS — E03.9 ACQUIRED HYPOTHYROIDISM: ICD-10-CM

## 2021-02-09 DIAGNOSIS — E66.09 CLASS 1 OBESITY DUE TO EXCESS CALORIES WITH SERIOUS COMORBIDITY AND BODY MASS INDEX (BMI) OF 30.0 TO 30.9 IN ADULT: ICD-10-CM

## 2021-02-09 DIAGNOSIS — Z79.890 MENOPAUSAL SYNDROME ON HORMONE REPLACEMENT THERAPY: Primary | ICD-10-CM

## 2021-02-09 DIAGNOSIS — M79.7 FIBROMYALGIA: ICD-10-CM

## 2021-02-09 DIAGNOSIS — N95.1 MENOPAUSAL SYNDROME ON HORMONE REPLACEMENT THERAPY: Primary | ICD-10-CM

## 2021-02-09 PROBLEM — E66.811 CLASS 1 OBESITY DUE TO EXCESS CALORIES WITH SERIOUS COMORBIDITY AND BODY MASS INDEX (BMI) OF 30.0 TO 30.9 IN ADULT: Status: ACTIVE | Noted: 2021-02-09

## 2021-02-09 PROCEDURE — 3079F DIAST BP 80-89 MM HG: CPT | Performed by: INTERNAL MEDICINE

## 2021-02-09 PROCEDURE — 3008F BODY MASS INDEX DOCD: CPT | Performed by: INTERNAL MEDICINE

## 2021-02-09 PROCEDURE — 99214 OFFICE O/P EST MOD 30 MIN: CPT | Performed by: INTERNAL MEDICINE

## 2021-02-09 PROCEDURE — 3074F SYST BP LT 130 MM HG: CPT | Performed by: INTERNAL MEDICINE

## 2021-02-09 RX ORDER — CYCLOSPORINE 0.5 MG/ML
EMULSION OPHTHALMIC
COMMUNITY
Start: 2021-01-01

## 2021-02-09 NOTE — PROGRESS NOTES
Glo Claudio is a 45year old female. HPI:   Patient presents for the following issues. 1. Chronic pain 2/2 lupus and fibromyalgia: not controlled on neuropathic agents for many months. Plans to discuss with rheumatology this week.  Marijuana gummy he Comment:Blister  Ultram [Tramadol]       ITCHING    Family History   Problem Relation Age of Onset   • Cancer Father         carcinoid tumor   • Other Father         Carcinoid - abdominal area   • Cancer Mother         breast cancer   • Breast Cancer Veena 94   Temp 98.8 °F (37.1 °C) (Oral)   Resp 16   Ht 5' 7\" (1.702 m)   Wt 192 lb 12.8 oz (87.5 kg)   SpO2 99%   BMI 30.20 kg/m²   GENERAL: A&O well developed, well nourished,in no apparent distress  SKIN: no rashes,no suspicious lesions but has many moles th With emgality, migraines improved to 1 time per month or less. # Hormone Therapy: on therapy 2/2 bilateral oopherectomy. S Ultimate goal is to d/c HRT but at this time quality of life >>risks of therapy.    # At risk for osteoporosis d/t lupus: normal DEX

## 2021-02-23 RX ORDER — ESTRADIOL AND NORETHINDRONE ACETATE .5; .1 MG/1; MG/1
TABLET ORAL
Qty: 84 TABLET | Refills: 0 | Status: SHIPPED | OUTPATIENT
Start: 2021-02-23 | End: 2021-05-12

## 2021-02-23 NOTE — TELEPHONE ENCOUNTER
Medication(s) to Refill:   Requested Prescriptions     Pending Prescriptions Disp Refills   • ESTRADIOL-NORETHINDRONE ACET 0.5-0.1 MG Oral Tab [Pharmacy Med Name: ESTRADIOL-NORETH 0.5-0.1 MG TB] 84 tablet 0     Sig: TAKE 1 TABLET BY MOUTH EVERY DAY       L

## 2021-03-03 PROBLEM — E89.0 POSTPROCEDURAL HYPOTHYROIDISM: Status: ACTIVE | Noted: 2020-01-23

## 2021-03-10 ENCOUNTER — LAB ENCOUNTER (OUTPATIENT)
Dept: LAB | Age: 51
End: 2021-03-10
Attending: INTERNAL MEDICINE
Payer: COMMERCIAL

## 2021-03-10 DIAGNOSIS — M32.19 SYSTEMIC LUPUS ERYTHEMATOSUS WITH OTHER ORGAN INVOLVEMENT, UNSPECIFIED SLE TYPE (HCC): ICD-10-CM

## 2021-03-10 DIAGNOSIS — M32.8 OTHER FORMS OF SYSTEMIC LUPUS ERYTHEMATOSUS, UNSPECIFIED ORGAN INVOLVEMENT STATUS (HCC): ICD-10-CM

## 2021-03-10 LAB
BASOPHILS # BLD AUTO: 0.03 X10(3) UL (ref 0–0.2)
BASOPHILS NFR BLD AUTO: 1.1 %
DEPRECATED RDW RBC AUTO: 48.5 FL (ref 35.1–46.3)
EOSINOPHIL # BLD AUTO: 0.14 X10(3) UL (ref 0–0.7)
EOSINOPHIL NFR BLD AUTO: 5.3 %
ERYTHROCYTE [DISTWIDTH] IN BLOOD BY AUTOMATED COUNT: 13 % (ref 11–15)
HAV IGM SER QL: NONREACTIVE
HBV CORE IGM SER QL: NONREACTIVE
HBV SURFACE AG SERPL QL IA: NONREACTIVE
HCT VFR BLD AUTO: 36.1 %
HCV AB SERPL QL IA: NONREACTIVE
HGB BLD-MCNC: 11.9 G/DL
IMM GRANULOCYTES # BLD AUTO: 0 X10(3) UL (ref 0–1)
IMM GRANULOCYTES NFR BLD: 0 %
LYMPHOCYTES # BLD AUTO: 0.98 X10(3) UL (ref 1–4)
LYMPHOCYTES NFR BLD AUTO: 37.1 %
MCH RBC QN AUTO: 33.8 PG (ref 26–34)
MCHC RBC AUTO-ENTMCNC: 33 G/DL (ref 31–37)
MCV RBC AUTO: 102.6 FL
MONOCYTES # BLD AUTO: 0.33 X10(3) UL (ref 0.1–1)
MONOCYTES NFR BLD AUTO: 12.5 %
NEUTROPHILS # BLD AUTO: 1.16 X10 (3) UL (ref 1.5–7.7)
NEUTROPHILS # BLD AUTO: 1.16 X10(3) UL (ref 1.5–7.7)
NEUTROPHILS NFR BLD AUTO: 44 %
PLATELET # BLD AUTO: 201 10(3)UL (ref 150–450)
RBC # BLD AUTO: 3.52 X10(6)UL
WBC # BLD AUTO: 2.6 X10(3) UL (ref 4–11)

## 2021-03-10 PROCEDURE — 86480 TB TEST CELL IMMUN MEASURE: CPT

## 2021-03-10 PROCEDURE — 80074 ACUTE HEPATITIS PANEL: CPT

## 2021-03-10 PROCEDURE — 36415 COLL VENOUS BLD VENIPUNCTURE: CPT

## 2021-03-10 PROCEDURE — 85025 COMPLETE CBC W/AUTO DIFF WBC: CPT

## 2021-03-12 LAB
M TB IFN-G CD4+ T-CELLS BLD-ACNC: 0.08 IU/ML
M TB TUBERC IFN-G BLD QL: NEGATIVE
M TB TUBERC IGNF/MITOGEN IGNF CONTROL: >10 IU/ML
QFT TB1 AG MINUS NIL: 0.07 IU/ML
QFT TB2 AG MINUS NIL: 0.07 IU/ML

## 2021-03-22 RX ORDER — GALCANEZUMAB 120 MG/ML
120 INJECTION, SOLUTION SUBCUTANEOUS
Qty: 1 ML | Refills: 2 | Status: SHIPPED | OUTPATIENT
Start: 2021-03-22 | End: 2021-04-27

## 2021-03-22 NOTE — TELEPHONE ENCOUNTER
Medication(s) to Refill:   Requested Prescriptions     Pending Prescriptions Disp Refills   • EMGALITY 120 MG/ML Subcutaneous Solution Auto-injector [Pharmacy Med Name: EMGALITY 120 MG/ML PEN]  2     Sig: INJECT 120 MG INTO THE SKIN EVERY 30 (THIRTY) DAYS. [facility].   [facility] Hours of Operation: [id number]&nbsp; 8:00AM - 4:10PM

## 2021-03-23 ENCOUNTER — LAB ENCOUNTER (OUTPATIENT)
Dept: LAB | Age: 51
End: 2021-03-23
Attending: INTERNAL MEDICINE
Payer: COMMERCIAL

## 2021-03-23 DIAGNOSIS — M32.8 OTHER FORMS OF SYSTEMIC LUPUS ERYTHEMATOSUS, UNSPECIFIED ORGAN INVOLVEMENT STATUS (HCC): ICD-10-CM

## 2021-03-23 DIAGNOSIS — R79.89 ABNORMAL CBC: ICD-10-CM

## 2021-03-23 LAB
ALBUMIN SERPL-MCNC: 3.8 G/DL (ref 3.4–5)
ALP LIVER SERPL-CCNC: 124 U/L
ALT SERPL-CCNC: 30 U/L
AST SERPL-CCNC: 22 U/L (ref 15–37)
BASOPHILS # BLD AUTO: 0.04 X10(3) UL (ref 0–0.2)
BASOPHILS NFR BLD AUTO: 1.5 %
BILIRUB DIRECT SERPL-MCNC: <0.1 MG/DL (ref 0–0.2)
BILIRUB SERPL-MCNC: 0.3 MG/DL (ref 0.1–2)
BUN BLD-MCNC: 14 MG/DL (ref 7–18)
CREAT BLD-MCNC: 0.7 MG/DL
CRP SERPL-MCNC: <0.29 MG/DL (ref ?–0.3)
DEPRECATED RDW RBC AUTO: 47.7 FL (ref 35.1–46.3)
EOSINOPHIL # BLD AUTO: 0.15 X10(3) UL (ref 0–0.7)
EOSINOPHIL NFR BLD AUTO: 5.7 %
ERYTHROCYTE [DISTWIDTH] IN BLOOD BY AUTOMATED COUNT: 12.9 % (ref 11–15)
HCT VFR BLD AUTO: 35.9 %
HGB BLD-MCNC: 11.9 G/DL
IMM GRANULOCYTES # BLD AUTO: 0 X10(3) UL (ref 0–1)
IMM GRANULOCYTES NFR BLD: 0 %
LYMPHOCYTES # BLD AUTO: 0.96 X10(3) UL (ref 1–4)
LYMPHOCYTES NFR BLD AUTO: 36.2 %
M PROTEIN MFR SERPL ELPH: 7.1 G/DL (ref 6.4–8.2)
MCH RBC QN AUTO: 33.8 PG (ref 26–34)
MCHC RBC AUTO-ENTMCNC: 33.1 G/DL (ref 31–37)
MCV RBC AUTO: 102 FL
MONOCYTES # BLD AUTO: 0.26 X10(3) UL (ref 0.1–1)
MONOCYTES NFR BLD AUTO: 9.8 %
NEUTROPHILS # BLD AUTO: 1.24 X10 (3) UL (ref 1.5–7.7)
NEUTROPHILS # BLD AUTO: 1.24 X10(3) UL (ref 1.5–7.7)
NEUTROPHILS NFR BLD AUTO: 46.8 %
PLATELET # BLD AUTO: 226 10(3)UL (ref 150–450)
RBC # BLD AUTO: 3.52 X10(6)UL
SED RATE-ML: 34 MM/HR
WBC # BLD AUTO: 2.7 X10(3) UL (ref 4–11)

## 2021-03-23 PROCEDURE — 36415 COLL VENOUS BLD VENIPUNCTURE: CPT

## 2021-03-23 PROCEDURE — 85025 COMPLETE CBC W/AUTO DIFF WBC: CPT

## 2021-03-23 PROCEDURE — 84520 ASSAY OF UREA NITROGEN: CPT

## 2021-03-23 PROCEDURE — 85652 RBC SED RATE AUTOMATED: CPT

## 2021-03-23 PROCEDURE — 80076 HEPATIC FUNCTION PANEL: CPT

## 2021-03-23 PROCEDURE — 86140 C-REACTIVE PROTEIN: CPT

## 2021-03-23 PROCEDURE — 82565 ASSAY OF CREATININE: CPT

## 2021-03-23 PROCEDURE — 86256 FLUORESCENT ANTIBODY TITER: CPT

## 2021-04-27 RX ORDER — GALCANEZUMAB 120 MG/ML
120 INJECTION, SOLUTION SUBCUTANEOUS
Qty: 1 ML | Refills: 2 | Status: SHIPPED | OUTPATIENT
Start: 2021-04-27 | End: 2021-05-28

## 2021-04-27 NOTE — TELEPHONE ENCOUNTER
Refill needed Lily #[id number] - NEW pharmacy    Galcanezumab-[facility]) 120 MG/ML Subcutaneous Solution Auto-injector

## 2021-05-11 ENCOUNTER — TELEPHONE (OUTPATIENT)
Dept: INTERNAL MEDICINE CLINIC | Facility: CLINIC | Age: 51
End: 2021-05-11

## 2021-05-11 NOTE — TELEPHONE ENCOUNTER
Key: [id number]    Your information has been submitted to [employer]. [employer] is reviewing the PA request and you will receive an electronic response.  You may check for the updated outcome later by reopening this request. The standard fax determination

## 2021-05-12 RX ORDER — ESTRADIOL AND NORETHINDRONE ACETATE .5; .1 MG/1; MG/1
TABLET ORAL
Qty: 84 TABLET | Refills: 0 | Status: SHIPPED | OUTPATIENT
Start: 2021-05-12 | End: 2021-07-23

## 2021-05-14 NOTE — TELEPHONE ENCOUNTER
I discussed emgality coverage with patient. She has changed insurance and plans to re-process medication through pharmacy with new insurance today. However, I have completed PA paperwork in case it is needed. It is in my physical inbasket.

## 2021-05-18 NOTE — TELEPHONE ENCOUNTER
Pt called to check status of prior authorization paperwork.  Pt requests to have paperwork sent to braden listed below -     [address], [address] Anamika Samuels [facility], [phone], [phone]

## 2021-05-19 NOTE — TELEPHONE ENCOUNTER
Patient was to let us know if insurance needed PA with new insurance. [facility] [phone]. Awaiting PA decision.     Paperwork by Christiana Wagner, EUGENE hammond

## 2021-05-28 ENCOUNTER — OFFICE VISIT (OUTPATIENT)
Dept: INTERNAL MEDICINE CLINIC | Facility: CLINIC | Age: 51
End: 2021-05-28
Payer: COMMERCIAL

## 2021-05-28 VITALS
SYSTOLIC BLOOD PRESSURE: 120 MMHG | WEIGHT: 199 LBS | OXYGEN SATURATION: 100 % | HEART RATE: 68 BPM | HEIGHT: 67 IN | BODY MASS INDEX: 31.23 KG/M2 | TEMPERATURE: 97 F | RESPIRATION RATE: 16 BRPM | DIASTOLIC BLOOD PRESSURE: 66 MMHG

## 2021-05-28 DIAGNOSIS — R10.84 DIFFUSE ABDOMINAL PAIN: ICD-10-CM

## 2021-05-28 DIAGNOSIS — M79.7 FIBROMYALGIA: ICD-10-CM

## 2021-05-28 DIAGNOSIS — G43.709 CHRONIC MIGRAINE WITHOUT AURA WITHOUT STATUS MIGRAINOSUS, NOT INTRACTABLE: ICD-10-CM

## 2021-05-28 DIAGNOSIS — Z00.00 ROUTINE GENERAL MEDICAL EXAMINATION AT A HEALTH CARE FACILITY: Primary | ICD-10-CM

## 2021-05-28 PROCEDURE — 99396 PREV VISIT EST AGE 40-64: CPT | Performed by: INTERNAL MEDICINE

## 2021-05-28 PROCEDURE — 3078F DIAST BP <80 MM HG: CPT | Performed by: INTERNAL MEDICINE

## 2021-05-28 PROCEDURE — 99214 OFFICE O/P EST MOD 30 MIN: CPT | Performed by: INTERNAL MEDICINE

## 2021-05-28 PROCEDURE — 90471 IMMUNIZATION ADMIN: CPT | Performed by: INTERNAL MEDICINE

## 2021-05-28 PROCEDURE — 90750 HZV VACC RECOMBINANT IM: CPT | Performed by: INTERNAL MEDICINE

## 2021-05-28 PROCEDURE — 3008F BODY MASS INDEX DOCD: CPT | Performed by: INTERNAL MEDICINE

## 2021-05-28 PROCEDURE — 87624 HPV HI-RISK TYP POOLED RSLT: CPT | Performed by: INTERNAL MEDICINE

## 2021-05-28 PROCEDURE — 3074F SYST BP LT 130 MM HG: CPT | Performed by: INTERNAL MEDICINE

## 2021-05-28 RX ORDER — SUMATRIPTAN 25 MG/1
25 TABLET, FILM COATED ORAL EVERY 2 HOUR PRN
Qty: 15 TABLET | Refills: 0 | Status: SHIPPED | OUTPATIENT
Start: 2021-05-28 | End: 2021-06-14

## 2021-05-28 RX ORDER — PROPRANOLOL HCL 60 MG
60 CAPSULE, EXTENDED RELEASE 24HR ORAL DAILY
Qty: 90 CAPSULE | Refills: 1 | Status: SHIPPED | OUTPATIENT
Start: 2021-05-28 | End: 2021-08-02

## 2021-05-28 RX ORDER — NORTRIPTYLINE HYDROCHLORIDE 25 MG/1
25 CAPSULE ORAL NIGHTLY
COMMUNITY
End: 2021-12-09

## 2021-05-28 NOTE — PROGRESS NOTES
Whitney Fay is a 45year old female. HPI:   Patient presents for a physical exam. She was 5 minutes late to appt. 1. Chronic Migraines: insurance denied her emgality. She is now suffering from daily migraines and headaches.  Using tylenol or motrin Colon Polyps Mother    • Cancer Maternal Grandmother         stomach cacner   • Breast Cancer Maternal Aunt    • Breast Cancer Maternal Aunt    • Other Maternal Aunt         her daughter had ovarian cancer - June   • Colon Polyps Brother       Past Medical many moles throughout  HEENT: atraumatic,   NECK: supple, no jvd, no thyromegaly, no cervical lad  LUNGS: clear to auscultation bilateraly, no c/w/r  CARDIO: RRR without g/m/r  GI: softly distended, mildly tender throughout but no rebound or guarding, norm orophenadrine are being titrated per PM&R. Explained nutrition and exercise are key elements of living with fibromyalgia pain. # Hormone Therapy: on therapy 2/2 bilateral oopherectomy.  Ultimate goal is to d/c HRT but at this time quality of life >>risks

## 2021-06-05 ENCOUNTER — HOSPITAL ENCOUNTER (OUTPATIENT)
Age: 51
Discharge: HOME | End: 2021-06-05
Payer: COMMERCIAL

## 2021-06-05 ENCOUNTER — APPOINTMENT (OUTPATIENT)
Dept: GENERAL RADIOLOGY | Age: 51
End: 2021-06-05
Attending: NURSE PRACTITIONER
Payer: COMMERCIAL

## 2021-06-05 VITALS
SYSTOLIC BLOOD PRESSURE: 122 MMHG | TEMPERATURE: 98 F | OXYGEN SATURATION: 100 % | DIASTOLIC BLOOD PRESSURE: 79 MMHG | HEART RATE: 66 BPM | RESPIRATION RATE: 16 BRPM

## 2021-06-05 DIAGNOSIS — M54.9 BACK PAIN WITHOUT RADIATION: Primary | ICD-10-CM

## 2021-06-05 DIAGNOSIS — R82.998 URINE LEUKOCYTES INCREASED: ICD-10-CM

## 2021-06-05 PROCEDURE — 81002 URINALYSIS NONAUTO W/O SCOPE: CPT | Performed by: NURSE PRACTITIONER

## 2021-06-05 PROCEDURE — 72110 X-RAY EXAM L-2 SPINE 4/>VWS: CPT | Performed by: NURSE PRACTITIONER

## 2021-06-05 PROCEDURE — 87086 URINE CULTURE/COLONY COUNT: CPT | Performed by: NURSE PRACTITIONER

## 2021-06-05 PROCEDURE — 99213 OFFICE O/P EST LOW 20 MIN: CPT | Performed by: NURSE PRACTITIONER

## 2021-06-05 NOTE — ED PROVIDER NOTES
Patient Seen in: Immediate [address] [facility]      History   Patient presents with:  Back Pain    Stated Complaint: back pain    HPI/Subjective:   18-year-old female with history of lupus presents today with complaints of right back pain.   Symptoms started [employer] MASTECTOMY LEFT  01/2015   • MASTECTOMY MODIFIED RADICAL Bilateral 2015   • MASTECTOMY RIGHT  01/2015   • OTHER      partia hysterectomy   • THYROIDECTOMY     • THYROIDECTOMY  02/27/2012   • TONSILLECTOMY     • TOTAL ABDOM HYSTERECTOMY      Partial, tube a 1+ on the right side and 1+ on the left side. Comments: Good dorsiflexion bilateral great toes. Negative cauda equina syndrome. Normal straight leg raise bilaterally. Normal sensation upper lower legs.                ED Course     Labs Reviewed   POC of treatment plan based on this test.  Patient states does take a muscle relaxant at home but has not been taking because it makes her too sleepy. Will start taking it as prescribed to help with muscle stiffness.   Patient encouraged to take over-the-count

## 2021-06-05 NOTE — ED INITIAL ASSESSMENT (HPI)
Pt reports right sided back pain since Thursday. Denies any trauma. States it is radiating upward. No shortness of breath.

## 2021-06-14 RX ORDER — SUMATRIPTAN 25 MG/1
TABLET, FILM COATED ORAL
Qty: 15 TABLET | Refills: 0 | Status: SHIPPED | OUTPATIENT
Start: 2021-06-14 | End: 2021-07-06

## 2021-06-14 NOTE — TELEPHONE ENCOUNTER
Requesting SUMAtriptan Succinate 25 MG Oral Tab  LOV: 2/9/21  RTC: 3 months  Last Relevant Labs: 3/23/21  Filled: 5/28/21 #15 with 0 refills    Future Appointments   Date Time Provider Tyesha Nagel   8/2/2021  1:40 PM DO Jimenez Orosco

## 2021-07-06 RX ORDER — SUMATRIPTAN 25 MG/1
TABLET, FILM COATED ORAL
Qty: 15 TABLET | Refills: 0 | Status: SHIPPED | OUTPATIENT
Start: 2021-07-06 | End: 2021-07-26

## 2021-07-06 NOTE — TELEPHONE ENCOUNTER
Medication(s) to Refill:   Requested Prescriptions     Pending Prescriptions Disp Refills   • SUMATRIPTAN SUCCINATE 25 MG Oral Tab [Pharmacy Med Name: SUMATRIPTAN 25MG TABLETS] 15 tablet 0     Sig: TAKE 1 TABLET(25MG TOTAL) BY MOUTH EVERY 2 HOURS AS NEEDED

## 2021-07-20 ENCOUNTER — LAB ENCOUNTER (OUTPATIENT)
Dept: LAB | Age: 51
End: 2021-07-20
Attending: INTERNAL MEDICINE
Payer: COMMERCIAL

## 2021-07-20 DIAGNOSIS — Z79.899 LONG-TERM USE OF HIGH-RISK MEDICATION: ICD-10-CM

## 2021-07-20 DIAGNOSIS — R70.0 ELEVATED SED RATE: ICD-10-CM

## 2021-07-20 DIAGNOSIS — M79.18 MYOFASCIAL MUSCLE PAIN: ICD-10-CM

## 2021-07-20 DIAGNOSIS — M32.8 OTHER FORMS OF SYSTEMIC LUPUS ERYTHEMATOSUS, UNSPECIFIED ORGAN INVOLVEMENT STATUS (HCC): ICD-10-CM

## 2021-07-20 DIAGNOSIS — R79.89 ABNORMAL CBC: ICD-10-CM

## 2021-07-20 LAB
ALBUMIN SERPL-MCNC: 3.7 G/DL (ref 3.4–5)
ALP LIVER SERPL-CCNC: 91 U/L
ALT SERPL-CCNC: 22 U/L
AST SERPL-CCNC: 18 U/L (ref 15–37)
BASOPHILS # BLD AUTO: 0.03 X10(3) UL (ref 0–0.2)
BASOPHILS NFR BLD AUTO: 1.1 %
BILIRUB DIRECT SERPL-MCNC: 0.1 MG/DL (ref 0–0.2)
BILIRUB SERPL-MCNC: 0.5 MG/DL (ref 0.1–2)
BUN BLD-MCNC: 17 MG/DL (ref 7–18)
C3 SERPL-MCNC: 126 MG/DL (ref 90–180)
C4 SERPL-MCNC: 27.4 MG/DL (ref 10–40)
CREAT BLD-MCNC: 0.94 MG/DL
CRP SERPL-MCNC: <0.29 MG/DL (ref ?–0.3)
DEPRECATED RDW RBC AUTO: 50.4 FL (ref 35.1–46.3)
EOSINOPHIL # BLD AUTO: 0.05 X10(3) UL (ref 0–0.7)
EOSINOPHIL NFR BLD AUTO: 1.8 %
ERYTHROCYTE [DISTWIDTH] IN BLOOD BY AUTOMATED COUNT: 13.2 % (ref 11–15)
HCT VFR BLD AUTO: 34.2 %
HGB BLD-MCNC: 11.1 G/DL
IMM GRANULOCYTES # BLD AUTO: 0 X10(3) UL (ref 0–1)
IMM GRANULOCYTES NFR BLD: 0 %
LYMPHOCYTES # BLD AUTO: 1.02 X10(3) UL (ref 1–4)
LYMPHOCYTES NFR BLD AUTO: 37.1 %
M PROTEIN MFR SERPL ELPH: 6.7 G/DL (ref 6.4–8.2)
MCH RBC QN AUTO: 33.9 PG (ref 26–34)
MCHC RBC AUTO-ENTMCNC: 32.5 G/DL (ref 31–37)
MCV RBC AUTO: 104.6 FL
MONOCYTES # BLD AUTO: 0.26 X10(3) UL (ref 0.1–1)
MONOCYTES NFR BLD AUTO: 9.5 %
NEUTROPHILS # BLD AUTO: 1.39 X10 (3) UL (ref 1.5–7.7)
NEUTROPHILS # BLD AUTO: 1.39 X10(3) UL (ref 1.5–7.7)
NEUTROPHILS NFR BLD AUTO: 50.5 %
PLATELET # BLD AUTO: 185 10(3)UL (ref 150–450)
PROT UR-MCNC: <5 MG/DL
RBC # BLD AUTO: 3.27 X10(6)UL
SED RATE-ML: 29 MM/HR
VIT D+METAB SERPL-MCNC: 32.7 NG/ML (ref 30–100)
WBC # BLD AUTO: 2.8 X10(3) UL (ref 4–11)

## 2021-07-20 PROCEDURE — 86140 C-REACTIVE PROTEIN: CPT

## 2021-07-20 PROCEDURE — 85652 RBC SED RATE AUTOMATED: CPT

## 2021-07-20 PROCEDURE — 84156 ASSAY OF PROTEIN URINE: CPT

## 2021-07-20 PROCEDURE — 82306 VITAMIN D 25 HYDROXY: CPT

## 2021-07-20 PROCEDURE — 80076 HEPATIC FUNCTION PANEL: CPT

## 2021-07-20 PROCEDURE — 36415 COLL VENOUS BLD VENIPUNCTURE: CPT

## 2021-07-20 PROCEDURE — 86160 COMPLEMENT ANTIGEN: CPT

## 2021-07-20 PROCEDURE — 82565 ASSAY OF CREATININE: CPT

## 2021-07-20 PROCEDURE — 84520 ASSAY OF UREA NITROGEN: CPT

## 2021-07-20 PROCEDURE — 85025 COMPLETE CBC W/AUTO DIFF WBC: CPT

## 2021-07-20 PROCEDURE — 86256 FLUORESCENT ANTIBODY TITER: CPT

## 2021-07-23 ENCOUNTER — PATIENT MESSAGE (OUTPATIENT)
Dept: INTERNAL MEDICINE CLINIC | Facility: CLINIC | Age: 51
End: 2021-07-23

## 2021-07-23 RX ORDER — ESTRADIOL AND NORETHINDRONE ACETATE .5; .1 MG/1; MG/1
TABLET ORAL
Qty: 84 TABLET | Refills: 0 | Status: SHIPPED | OUTPATIENT
Start: 2021-07-23 | End: 2021-08-02

## 2021-07-23 NOTE — TELEPHONE ENCOUNTER
Medication(s) to Refill:   Requested Prescriptions     Pending Prescriptions Disp Refills   • ESTRADIOL-NORETHINDRONE ACET 0.5-0.1 MG Oral Tab [Pharmacy Med Name: ESTRA 0.5MG/NORETH 0.1MG TB LOW STR] 84 tablet 0     Sig: TAKE 1 TABLET BY MOUTH DAILY

## 2021-07-24 NOTE — TELEPHONE ENCOUNTER
From: William Carter  To: Jadyn Guthrie MD  Sent: 7/23/2021 4:36 PM CDT  Subject: Prescription Question    Hi Dr. Candace Hernandez,    I am switching all my medication to [facility]. You should be receiving prescription request from them.     Norma Rai

## 2021-07-26 ENCOUNTER — PATIENT MESSAGE (OUTPATIENT)
Dept: INTERNAL MEDICINE CLINIC | Facility: CLINIC | Age: 51
End: 2021-07-26

## 2021-07-26 NOTE — TELEPHONE ENCOUNTER
From: Dorothy Sterling  To: Juanita Hall MD  Sent: 7/26/2021 2:09 PM CDT  Subject: Prescription Question    Ange Fraser,  I will need Sumatriptan.     Dudley Donnelly

## 2021-07-27 RX ORDER — SUMATRIPTAN 25 MG/1
25 TABLET, FILM COATED ORAL EVERY 2 HOUR PRN
Qty: 15 TABLET | Refills: 0 | Status: SHIPPED | OUTPATIENT
Start: 2021-07-27 | End: 2021-08-02

## 2021-07-29 ENCOUNTER — PATIENT MESSAGE (OUTPATIENT)
Dept: INTERNAL MEDICINE CLINIC | Facility: CLINIC | Age: 51
End: 2021-07-29

## 2021-07-29 RX ORDER — GABAPENTIN 300 MG/1
CAPSULE ORAL
Qty: 900 CAPSULE | Refills: 3 | OUTPATIENT
Start: 2021-07-29

## 2021-07-29 RX ORDER — SUMATRIPTAN 25 MG/1
25 TABLET, FILM COATED ORAL EVERY 2 HOUR PRN
Qty: 15 TABLET | Refills: 0 | OUTPATIENT
Start: 2021-07-29

## 2021-07-29 RX ORDER — PROPRANOLOL HCL 60 MG
60 CAPSULE, EXTENDED RELEASE 24HR ORAL DAILY
Qty: 90 CAPSULE | Refills: 1 | OUTPATIENT
Start: 2021-07-29 | End: 2021-08-28

## 2021-07-29 RX ORDER — LEVOTHYROXINE SODIUM 88 UG/1
88 TABLET ORAL DAILY
Qty: 30 TABLET | Refills: 6 | OUTPATIENT
Start: 2021-07-29 | End: 2022-02-24

## 2021-07-29 RX ORDER — ESTRADIOL AND NORETHINDRONE ACETATE .5; .1 MG/1; MG/1
1 TABLET ORAL DAILY
Qty: 84 TABLET | Refills: 0 | OUTPATIENT
Start: 2021-07-29

## 2021-07-29 NOTE — TELEPHONE ENCOUNTER
Medication(s) to Refill:   Requested Prescriptions     Pending Prescriptions Disp Refills   • Estradiol-Norethindrone Acet 0.5-0.1 MG Oral Tab 84 tablet 0     Sig: Take 1 tablet by mouth daily.    • Levothyroxine Sodium 88 MCG Oral Tab 30 tablet 6     Sig:

## 2021-07-29 NOTE — TELEPHONE ENCOUNTER
From: Duke Stubbs  To: Oc Fuentes MD  Sent: 7/29/2021 11:29 AM CDT  Subject: Prescription Question    Dr Marie Waldrop,    I received a message stating that my prescription request were denied.  I am trying to get all of my medications switch to [facility]

## 2021-07-30 NOTE — TELEPHONE ENCOUNTER
1. The levothyroxine is ordered by her endocrinologist  2. The gabapentin is ordered by a different specialist  3. I already sent refills for her estradiol and sumatriptan within the past 1 week.

## 2021-08-02 RX ORDER — PROPRANOLOL HCL 60 MG
60 CAPSULE, EXTENDED RELEASE 24HR ORAL DAILY
Qty: 90 CAPSULE | Refills: 1 | Status: SHIPPED | OUTPATIENT
Start: 2021-08-02 | End: 2021-08-23

## 2021-08-02 RX ORDER — ESTRADIOL AND NORETHINDRONE ACETATE .5; .1 MG/1; MG/1
1 TABLET ORAL DAILY
Qty: 84 TABLET | Refills: 0 | Status: SHIPPED | OUTPATIENT
Start: 2021-08-02 | End: 2021-09-29

## 2021-08-02 RX ORDER — SUMATRIPTAN 25 MG/1
25 TABLET, FILM COATED ORAL EVERY 2 HOUR PRN
Qty: 15 TABLET | Refills: 0 | Status: SHIPPED | OUTPATIENT
Start: 2021-08-02 | End: 2021-08-25

## 2021-08-02 NOTE — TELEPHONE ENCOUNTER
Patient states , she did not  rx as insurance no longer prefers that pharmacy.     Rx for Sumatriptan, Estradiol and Propanolol pended for new pharmacy

## 2021-08-09 NOTE — TELEPHONE ENCOUNTER
Levothyroxine 88 mcg failed protocol due to  Thyroid Supplements Protocol [id number]/07/2021 07:48 AM   TSH value between 0.350 and 5.500 IU/ml   Filled 3-3-21  Qty 30  6 refills  Upcoming appt. 3-3-22: Dr. Eve Fuentes 5-28-21

## 2021-08-10 RX ORDER — LEVOTHYROXINE SODIUM 88 UG/1
88 TABLET ORAL DAILY
Qty: 90 TABLET | Refills: 1 | OUTPATIENT
Start: 2021-08-10

## 2021-08-10 NOTE — TELEPHONE ENCOUNTER
LOV 3/2021 and advised to change dose and do labs 6 weeks later- labs never done.  She needs to repeat labs prior to refill

## 2021-08-11 ENCOUNTER — HOSPITAL ENCOUNTER (OUTPATIENT)
Age: 51
Discharge: HOME | End: 2021-08-11
Payer: COMMERCIAL

## 2021-08-11 VITALS
OXYGEN SATURATION: 100 % | SYSTOLIC BLOOD PRESSURE: 121 MMHG | TEMPERATURE: 98 F | RESPIRATION RATE: 18 BRPM | HEART RATE: 66 BPM | DIASTOLIC BLOOD PRESSURE: 70 MMHG

## 2021-08-11 DIAGNOSIS — L57.8 DERMATITIS DUE TO SUN: Primary | ICD-10-CM

## 2021-08-11 PROCEDURE — 99213 OFFICE O/P EST LOW 20 MIN: CPT | Performed by: NURSE PRACTITIONER

## 2021-08-11 RX ORDER — METHYLPREDNISOLONE 4 MG/1
TABLET ORAL
Qty: 1 EACH | Refills: 0 | Status: SHIPPED | OUTPATIENT
Start: 2021-08-11

## 2021-08-11 NOTE — ED PROVIDER NOTES
Patient Seen in: Immediate [address] [place] [address]      History   Patient presents with:  Rash Skin Problem    Stated Complaint: reaction to sun/rash    HPI/Subjective:   HPI  58-year-old female presents immediate care stating that she has an allergy to sunlight and Social History    Tobacco Use      Smoking status: Never Smoker      Smokeless tobacco: Never Used    Vaping Use      Vaping Use: Never used    Alcohol use: Never    Drug use: Yes      Types: Cannabis      Comment: marijuana gummies 1-2 time provide relief. Patient also instructed to follow-up with dermatology.                                Disposition and Plan     Clinical Impression:  Dermatitis due to sun  (primary encounter diagnosis)     Disposition:  Discharge  8/11/2021 10:15 am    CBS

## 2021-08-23 ENCOUNTER — PATIENT MESSAGE (OUTPATIENT)
Dept: INTERNAL MEDICINE CLINIC | Facility: CLINIC | Age: 51
End: 2021-08-23

## 2021-08-23 RX ORDER — SUMATRIPTAN 25 MG/1
TABLET, FILM COATED ORAL
Qty: 9 TABLET | Refills: 27 | OUTPATIENT
Start: 2021-08-23

## 2021-08-23 RX ORDER — PROPRANOLOL HYDROCHLORIDE 80 MG/1
80 CAPSULE, EXTENDED RELEASE ORAL DAILY
Qty: 90 CAPSULE | Refills: 0 | Status: SHIPPED | OUTPATIENT
Start: 2021-08-23 | End: 2021-11-09

## 2021-08-23 NOTE — TELEPHONE ENCOUNTER
From: William Carter  To: Jadyn Guthrie MD  Sent: 8/23/2021 10:29 AM CDT  Subject: Non-Urgent Medical Question    Hi Dr Candace Hernandez,    I am reaching out to you regarding my headaches. I am getting headaches four to five days out of the week.  Can the current medica

## 2021-08-25 RX ORDER — SUMATRIPTAN 25 MG/1
25 TABLET, FILM COATED ORAL EVERY 2 HOUR PRN
Qty: 15 TABLET | Refills: 3 | Status: SHIPPED | OUTPATIENT
Start: 2021-08-25 | End: 2022-02-04

## 2021-08-26 ENCOUNTER — LAB ENCOUNTER (OUTPATIENT)
Dept: LAB | Age: 51
End: 2021-08-26
Attending: INTERNAL MEDICINE
Payer: COMMERCIAL

## 2021-08-26 DIAGNOSIS — E89.0 POSTPROCEDURAL HYPOTHYROIDISM: ICD-10-CM

## 2021-08-26 DIAGNOSIS — E89.0 POSTSURGICAL HYPOTHYROIDISM: Primary | ICD-10-CM

## 2021-08-26 LAB
THYROGLOB SERPL-MCNC: <15 U/ML (ref ?–60)
TSI SER-ACNC: 0.85 MIU/ML (ref 0.36–3.74)

## 2021-08-26 PROCEDURE — 84443 ASSAY THYROID STIM HORMONE: CPT

## 2021-08-26 PROCEDURE — 36415 COLL VENOUS BLD VENIPUNCTURE: CPT

## 2021-08-26 PROCEDURE — 86800 THYROGLOBULIN ANTIBODY: CPT

## 2021-08-26 NOTE — PROGRESS NOTES
Fani Montoya, your thyroid labs look good. Please continue your current dose- 88 mcg daily. Her annual visit will be due in March. Recommend she schedule that now as spots are getting booked up.     Thanks,     Dr. Katia Loera

## 2021-09-29 RX ORDER — ESTRADIOL AND NORETHINDRONE ACETATE .5; .1 MG/1; MG/1
TABLET ORAL
Qty: 84 TABLET | Refills: 3 | Status: SHIPPED | OUTPATIENT
Start: 2021-09-29

## 2021-09-29 NOTE — TELEPHONE ENCOUNTER
Mammogram not needed, mastectomy was done back in 2015    Estradiol-Norethindrone  Failed protocol due to  Gynecology Medication Protocol Failed 09/28/2021 08:23 AM    Mammogram in past 12 months   Filled 8-2-21  Qty 84  0 refills  No upcoming appt.    LOV

## 2021-10-12 ENCOUNTER — HOSPITAL ENCOUNTER (OUTPATIENT)
Age: 51
Discharge: HOME | End: 2021-10-12
Payer: COMMERCIAL

## 2021-10-12 VITALS
HEART RATE: 74 BPM | HEIGHT: 68 IN | DIASTOLIC BLOOD PRESSURE: 52 MMHG | WEIGHT: 195 LBS | SYSTOLIC BLOOD PRESSURE: 118 MMHG | OXYGEN SATURATION: 100 % | TEMPERATURE: 99 F | RESPIRATION RATE: 18 BRPM | BODY MASS INDEX: 29.55 KG/M2

## 2021-10-12 DIAGNOSIS — Z20.822 CLOSE EXPOSURE TO COVID-19 VIRUS: Primary | ICD-10-CM

## 2021-10-12 PROCEDURE — U0002 COVID-19 LAB TEST NON-CDC: HCPCS | Performed by: NURSE PRACTITIONER

## 2021-10-12 PROCEDURE — 99213 OFFICE O/P EST LOW 20 MIN: CPT | Performed by: NURSE PRACTITIONER

## 2021-10-12 NOTE — ED PROVIDER NOTES
Patient Seen in: Immediate [facility]      History   Patient presents with:  Testing  Headache    Stated Complaint: headache/exposed to covid    Subjective:   58-year-old female presents the IC with sinus congestion.   Patient has had Covid test.  Celestino Blood TONSILLECTOMY     • TOTAL ABDOM HYSTERECTOMY      Partial, tube and ovaries removed                Social History    Tobacco Use      Smoking status: Never Smoker      Smokeless tobacco: Never Used    Vaping Use      Vaping Use: Never used    Alcohol use: and return to emerge of any worsening symptoms or concerns.   Vital signs reviewed nontoxic non-ill-appearing no acute respiratory stress was noted                             Disposition and Plan     Clinical Impression:  Close exposure to COVID-19 virus

## 2021-11-09 RX ORDER — PROPRANOLOL HYDROCHLORIDE 80 MG/1
1 CAPSULE, EXTENDED RELEASE ORAL DAILY
Qty: 90 CAPSULE | Refills: 1 | Status: SHIPPED | OUTPATIENT
Start: 2021-11-09 | End: 2022-05-06

## 2021-11-23 RX ORDER — SUMATRIPTAN 25 MG/1
TABLET, FILM COATED ORAL
Qty: 9 TABLET | Refills: 27 | OUTPATIENT
Start: 2021-11-23

## 2021-11-23 NOTE — TELEPHONE ENCOUNTER
I just sent sumatriptan 9 tabs with 3 refills in August, 2021. It seems she is using it too frequently which means her migraines are not well controlled. If she is using 2-4 tabs at once, I would rather send sumatriptan  mg tabs so we can better keep track of her migraine frequency.

## 2021-12-01 NOTE — TELEPHONE ENCOUNTER
If patient is only using sumatriptan once weekly (which is totally appropriate) then she should not need refills right now. I sent 15 tabs in August with 3 refills.

## 2022-02-04 ENCOUNTER — PATIENT MESSAGE (OUTPATIENT)
Dept: INTERNAL MEDICINE CLINIC | Facility: CLINIC | Age: 52
End: 2022-02-04

## 2022-02-04 NOTE — TELEPHONE ENCOUNTER
Claudy Evans LPN 7/0/3111 33:03 AM CST      ----- Message -----  From: Ronal Arguelles  Sent: 2/4/2022 11:08 AM CST  To: [facility] Clinical Staff  Subject: Sumatriptan refill     I am no longer able to use [facility] due to a change in my insurance. I will need my request to be sent to [place] [facility].      Thank you   Kavin Morales

## 2022-02-05 RX ORDER — SUMATRIPTAN 25 MG/1
25 TABLET, FILM COATED ORAL EVERY 2 HOUR PRN
Qty: 15 TABLET | Refills: 3 | OUTPATIENT
Start: 2022-02-05

## 2022-02-06 RX ORDER — SUMATRIPTAN 25 MG/1
25 TABLET, FILM COATED ORAL EVERY 2 HOUR PRN
Qty: 15 TABLET | Refills: 3 | Status: SHIPPED | OUTPATIENT
Start: 2022-02-06

## 2022-02-15 ENCOUNTER — LAB ENCOUNTER (OUTPATIENT)
Dept: LAB | Age: 52
End: 2022-02-15
Attending: INTERNAL MEDICINE
Payer: COMMERCIAL

## 2022-02-15 DIAGNOSIS — D70.9 NEUTROPENIA, UNSPECIFIED TYPE (HCC): ICD-10-CM

## 2022-02-15 DIAGNOSIS — M32.8 OTHER FORMS OF SYSTEMIC LUPUS ERYTHEMATOSUS, UNSPECIFIED ORGAN INVOLVEMENT STATUS (HCC): ICD-10-CM

## 2022-02-15 DIAGNOSIS — R79.89 ABNORMAL CBC: ICD-10-CM

## 2022-02-15 LAB
ALBUMIN SERPL-MCNC: 3.7 G/DL (ref 3.4–5)
ALP LIVER SERPL-CCNC: 83 U/L
ALT SERPL-CCNC: 21 U/L
AST SERPL-CCNC: 14 U/L (ref 15–37)
BASOPHILS # BLD AUTO: 0.03 X10(3) UL (ref 0–0.2)
BASOPHILS NFR BLD AUTO: 1 %
BILIRUB DIRECT SERPL-MCNC: <0.1 MG/DL (ref 0–0.2)
BILIRUB SERPL-MCNC: 0.3 MG/DL (ref 0.1–2)
BUN BLD-MCNC: 14 MG/DL (ref 7–18)
C3 SERPL-MCNC: 142 MG/DL (ref 90–180)
C4 SERPL-MCNC: 37.2 MG/DL (ref 10–40)
CREAT BLD-MCNC: 1.05 MG/DL
CRP SERPL-MCNC: <0.29 MG/DL (ref ?–0.3)
EOSINOPHIL # BLD AUTO: 0.18 X10(3) UL (ref 0–0.7)
EOSINOPHIL NFR BLD AUTO: 6.3 %
ERYTHROCYTE [DISTWIDTH] IN BLOOD BY AUTOMATED COUNT: 13.1 %
ERYTHROCYTE [SEDIMENTATION RATE] IN BLOOD: 35 MM/HR
HCT VFR BLD AUTO: 34.2 %
HGB BLD-MCNC: 11.1 G/DL
IMM GRANULOCYTES # BLD AUTO: 0.01 X10(3) UL (ref 0–1)
IMM GRANULOCYTES NFR BLD: 0.3 %
LYMPHOCYTES # BLD AUTO: 0.8 X10(3) UL (ref 1–4)
LYMPHOCYTES NFR BLD AUTO: 28 %
MCH RBC QN AUTO: 32.9 PG (ref 26–34)
MCHC RBC AUTO-ENTMCNC: 32.5 G/DL (ref 31–37)
MCV RBC AUTO: 101.5 FL
MONOCYTES # BLD AUTO: 0.35 X10(3) UL (ref 0.1–1)
NEUTROPHILS # BLD AUTO: 1.49 X10 (3) UL (ref 1.5–7.7)
NEUTROPHILS # BLD AUTO: 1.49 X10(3) UL (ref 1.5–7.7)
NEUTROPHILS NFR BLD AUTO: 52.2 %
PLATELET # BLD AUTO: 204 10(3)UL (ref 150–450)
PROT SERPL-MCNC: 6.5 G/DL (ref 6.4–8.2)
PROT UR-MCNC: 12.5 MG/DL
RBC # BLD AUTO: 3.37 X10(6)UL
WBC # BLD AUTO: 2.9 X10(3) UL (ref 4–11)

## 2022-02-15 PROCEDURE — 84520 ASSAY OF UREA NITROGEN: CPT

## 2022-02-15 PROCEDURE — 80076 HEPATIC FUNCTION PANEL: CPT

## 2022-02-15 PROCEDURE — 85652 RBC SED RATE AUTOMATED: CPT

## 2022-02-15 PROCEDURE — 85025 COMPLETE CBC W/AUTO DIFF WBC: CPT

## 2022-02-15 PROCEDURE — 36415 COLL VENOUS BLD VENIPUNCTURE: CPT

## 2022-02-15 PROCEDURE — 86140 C-REACTIVE PROTEIN: CPT

## 2022-02-15 PROCEDURE — 84156 ASSAY OF PROTEIN URINE: CPT

## 2022-02-15 PROCEDURE — 82565 ASSAY OF CREATININE: CPT

## 2022-02-15 PROCEDURE — 86256 FLUORESCENT ANTIBODY TITER: CPT

## 2022-02-15 PROCEDURE — 86160 COMPLEMENT ANTIGEN: CPT

## 2022-02-17 ENCOUNTER — IMAGING SERVICES (OUTPATIENT)
Dept: OTHER | Age: 52
End: 2022-02-17

## 2022-02-17 ENCOUNTER — TELEPHONE (OUTPATIENT)
Dept: SPORTS MEDICINE | Age: 52
End: 2022-02-17

## 2022-02-18 ENCOUNTER — OFFICE VISIT (OUTPATIENT)
Dept: SPORTS MEDICINE | Age: 52
End: 2022-02-18

## 2022-02-18 VITALS
HEIGHT: 67 IN | SYSTOLIC BLOOD PRESSURE: 115 MMHG | WEIGHT: 202 LBS | HEART RATE: 71 BPM | BODY MASS INDEX: 31.71 KG/M2 | DIASTOLIC BLOOD PRESSURE: 79 MMHG

## 2022-02-18 DIAGNOSIS — S62.111A CLOSED CHIP FRACTURE OF TRIQUETRUM OF RIGHT WRIST, INITIAL ENCOUNTER: Primary | ICD-10-CM

## 2022-02-18 DIAGNOSIS — M25.531 RIGHT WRIST PAIN: ICD-10-CM

## 2022-02-18 DIAGNOSIS — M25.561 ACUTE PAIN OF RIGHT KNEE: ICD-10-CM

## 2022-02-18 DIAGNOSIS — S80.01XA CONTUSION OF RIGHT KNEE, INITIAL ENCOUNTER: ICD-10-CM

## 2022-02-18 PROCEDURE — 29075 APPL CST ELBW FNGR SHORT ARM: CPT | Performed by: FAMILY MEDICINE

## 2022-02-18 PROCEDURE — 99204 OFFICE O/P NEW MOD 45 MIN: CPT | Performed by: FAMILY MEDICINE

## 2022-02-18 RX ORDER — ORPHENADRINE CITRATE 100 MG/1
100 TABLET, EXTENDED RELEASE ORAL
COMMUNITY
Start: 2022-01-21 | End: 2022-09-14

## 2022-02-18 RX ORDER — LEVOTHYROXINE SODIUM 88 UG/1
88 TABLET ORAL
COMMUNITY
Start: 2022-02-17

## 2022-02-18 RX ORDER — METHOTREXATE 2.5 MG/1
TABLET ORAL
COMMUNITY
Start: 2021-09-28

## 2022-02-18 RX ORDER — PROPRANOLOL HYDROCHLORIDE 80 MG/1
CAPSULE, EXTENDED RELEASE ORAL
COMMUNITY
Start: 2022-02-16

## 2022-02-18 RX ORDER — SODIUM FLUORIDE 5 MG/G
CREAM DENTAL
COMMUNITY
Start: 2022-01-27

## 2022-02-18 RX ORDER — SUMATRIPTAN 25 MG/1
25 TABLET, FILM COATED ORAL
COMMUNITY
Start: 2022-02-06

## 2022-02-18 RX ORDER — FERROUS SULFATE 325(65) MG
325 TABLET ORAL DAILY
COMMUNITY

## 2022-02-18 RX ORDER — HYDROXYCHLOROQUINE SULFATE 200 MG/1
TABLET, FILM COATED ORAL
COMMUNITY
Start: 2021-09-28

## 2022-02-18 RX ORDER — ESTRADIOL AND NORETHINDRONE ACETATE 1; .5 MG/1; MG/1
1 TABLET ORAL DAILY
COMMUNITY

## 2022-02-18 RX ORDER — NORTRIPTYLINE HYDROCHLORIDE 25 MG/1
25 CAPSULE ORAL
COMMUNITY
Start: 2022-01-21 | End: 2022-09-14

## 2022-02-18 ASSESSMENT — ENCOUNTER SYMPTOMS
TROUBLE SWALLOWING: 0
VOMITING: 0
CHILLS: 0
NUMBNESS: 0
BACK PAIN: 0
SORE THROAT: 0
FATIGUE: 0
SINUS PAIN: 0
WOUND: 0
FEVER: 0
ACTIVITY CHANGE: 0
EYE PAIN: 0
SEIZURES: 0
EYE REDNESS: 0
NAUSEA: 0
SHORTNESS OF BREATH: 0
CONSTIPATION: 0
HEADACHES: 0
AGITATION: 0
ABDOMINAL PAIN: 0
NERVOUS/ANXIOUS: 0
WHEEZING: 0
SINUS PRESSURE: 0
CHEST TIGHTNESS: 0
PHOTOPHOBIA: 0
SLEEP DISTURBANCE: 0
DIZZINESS: 0
DIARRHEA: 0

## 2022-02-24 ENCOUNTER — OFFICE VISIT (OUTPATIENT)
Dept: INTERNAL MEDICINE CLINIC | Facility: CLINIC | Age: 52
End: 2022-02-24
Payer: COMMERCIAL

## 2022-02-24 ENCOUNTER — HOSPITAL ENCOUNTER (OUTPATIENT)
Dept: GENERAL RADIOLOGY | Age: 52
Discharge: HOME | End: 2022-02-24
Attending: INTERNAL MEDICINE
Payer: COMMERCIAL

## 2022-02-24 ENCOUNTER — TELEPHONE (OUTPATIENT)
Dept: INTERNAL MEDICINE CLINIC | Facility: CLINIC | Age: 52
End: 2022-02-24

## 2022-02-24 VITALS
RESPIRATION RATE: 16 BRPM | BODY MASS INDEX: 30.22 KG/M2 | HEIGHT: 68 IN | OXYGEN SATURATION: 99 % | HEART RATE: 70 BPM | WEIGHT: 199.38 LBS | TEMPERATURE: 98 F

## 2022-02-24 DIAGNOSIS — M79.671 ACUTE PAIN OF RIGHT FOOT: Primary | ICD-10-CM

## 2022-02-24 DIAGNOSIS — M25.561 ACUTE PAIN OF RIGHT KNEE: ICD-10-CM

## 2022-02-24 DIAGNOSIS — M25.571 ACUTE RIGHT ANKLE PAIN: ICD-10-CM

## 2022-02-24 DIAGNOSIS — M79.671 ACUTE PAIN OF RIGHT FOOT: ICD-10-CM

## 2022-02-24 DIAGNOSIS — M54.50 ACUTE RIGHT-SIDED LOW BACK PAIN WITHOUT SCIATICA: ICD-10-CM

## 2022-02-24 PROCEDURE — 99214 OFFICE O/P EST MOD 30 MIN: CPT | Performed by: INTERNAL MEDICINE

## 2022-02-24 PROCEDURE — 73610 X-RAY EXAM OF ANKLE: CPT | Performed by: INTERNAL MEDICINE

## 2022-02-24 PROCEDURE — 3008F BODY MASS INDEX DOCD: CPT | Performed by: INTERNAL MEDICINE

## 2022-02-24 PROCEDURE — 73630 X-RAY EXAM OF FOOT: CPT | Performed by: INTERNAL MEDICINE

## 2022-02-24 RX ORDER — CYCLOBENZAPRINE HCL 5 MG
5 TABLET ORAL NIGHTLY PRN
Qty: 15 TABLET | Refills: 0 | Status: SHIPPED | OUTPATIENT
Start: 2022-02-24

## 2022-02-24 RX ORDER — SODIUM FLUORIDE 5 MG/G
GEL, DENTIFRICE DENTAL
COMMUNITY
Start: 2022-01-27

## 2022-02-24 NOTE — TELEPHONE ENCOUNTER
Requested records from [facility] received and placed in RP basket

## 2022-02-24 NOTE — PATIENT INSTRUCTIONS
- Get x-rays of right ankle and foot done. See if they can do the x-rays across the pal today;otherwise you could try our [facility] location  - Start nightly muscle relaxant, cyclobenzaprine, for back stiffness. Take this instead of orphenadrine - skip your afternoon/evening orphenadrine dose when taking the cyclobenzaprine  - Follow up with the Orthopedic specialist as scheduled  - Take 1 Tylenol tablet daily for next week for anti-inflammatory purposes  - If your x-rays are abnormal we may refer you to a podiatrist.    It was a pleasure seeing you in the clinic today. Thank you for choosing the [facility] office for your healthcare needs. Please call at [phone] with any questions or concerns.     Angela Landers MD

## 2022-02-25 ENCOUNTER — HOSPITAL ENCOUNTER (OUTPATIENT)
Dept: GENERAL RADIOLOGY | Age: 52
Discharge: HOME | End: 2022-02-25
Attending: INTERNAL MEDICINE
Payer: COMMERCIAL

## 2022-02-25 DIAGNOSIS — R70.0 ELEVATED SED RATE: ICD-10-CM

## 2022-02-25 DIAGNOSIS — D70.9 NEUTROPENIA, UNSPECIFIED TYPE (HCC): ICD-10-CM

## 2022-02-25 DIAGNOSIS — M32.8 OTHER FORMS OF SYSTEMIC LUPUS ERYTHEMATOSUS, UNSPECIFIED ORGAN INVOLVEMENT STATUS (HCC): ICD-10-CM

## 2022-02-25 DIAGNOSIS — R79.89 ABNORMAL CBC: ICD-10-CM

## 2022-02-25 DIAGNOSIS — M35.01 SJOGREN'S SYNDROME WITH KERATOCONJUNCTIVITIS SICCA (HCC): ICD-10-CM

## 2022-02-25 PROCEDURE — 71046 X-RAY EXAM CHEST 2 VIEWS: CPT | Performed by: INTERNAL MEDICINE

## 2022-03-01 ASSESSMENT — ENCOUNTER SYMPTOMS
CHEST TIGHTNESS: 0
PHOTOPHOBIA: 0
SEIZURES: 0
FEVER: 0
FATIGUE: 0
EYE REDNESS: 0
EYE PAIN: 0
ABDOMINAL PAIN: 0
SINUS PAIN: 0
NUMBNESS: 0
DIARRHEA: 0
SHORTNESS OF BREATH: 0
AGITATION: 0
CONSTIPATION: 0
SINUS PRESSURE: 0
DIZZINESS: 0
SLEEP DISTURBANCE: 0
SORE THROAT: 0
HEADACHES: 0
ACTIVITY CHANGE: 0
WHEEZING: 0
BACK PAIN: 0
NAUSEA: 0
TROUBLE SWALLOWING: 0
NERVOUS/ANXIOUS: 0

## 2022-03-07 ENCOUNTER — IMAGING SERVICES (OUTPATIENT)
Dept: GENERAL RADIOLOGY | Age: 52
End: 2022-03-07
Attending: FAMILY MEDICINE

## 2022-03-07 ENCOUNTER — OFFICE VISIT (OUTPATIENT)
Dept: SPORTS MEDICINE | Age: 52
End: 2022-03-07

## 2022-03-07 VITALS — WEIGHT: 202 LBS | BODY MASS INDEX: 31.71 KG/M2 | HEIGHT: 67 IN

## 2022-03-07 DIAGNOSIS — S62.111D CLOSED CHIP FRACTURE OF RIGHT TRIQUETRUM WITH ROUTINE HEALING, SUBSEQUENT ENCOUNTER: ICD-10-CM

## 2022-03-07 DIAGNOSIS — S80.01XD CONTUSION OF RIGHT KNEE, SUBSEQUENT ENCOUNTER: ICD-10-CM

## 2022-03-07 DIAGNOSIS — S62.111D CLOSED CHIP FRACTURE OF RIGHT TRIQUETRUM WITH ROUTINE HEALING, SUBSEQUENT ENCOUNTER: Primary | ICD-10-CM

## 2022-03-07 PROBLEM — C73 PAPILLARY THYROID CARCINOMA (HCC): Status: ACTIVE | Noted: 2022-03-07

## 2022-03-07 PROCEDURE — 29075 APPL CST ELBW FNGR SHORT ARM: CPT | Performed by: FAMILY MEDICINE

## 2022-03-07 PROCEDURE — 99213 OFFICE O/P EST LOW 20 MIN: CPT | Performed by: FAMILY MEDICINE

## 2022-03-10 ENCOUNTER — OFFICE VISIT (OUTPATIENT)
Dept: SPORTS MEDICINE | Age: 52
End: 2022-03-10

## 2022-03-10 ENCOUNTER — TELEPHONE (OUTPATIENT)
Dept: SPORTS MEDICINE | Age: 52
End: 2022-03-10

## 2022-03-10 DIAGNOSIS — S62.111D CLOSED CHIP FRACTURE OF RIGHT TRIQUETRUM WITH ROUTINE HEALING, SUBSEQUENT ENCOUNTER: ICD-10-CM

## 2022-03-10 PROCEDURE — 29075 APPL CST ELBW FNGR SHORT ARM: CPT | Performed by: FAMILY MEDICINE

## 2022-03-18 ASSESSMENT — ENCOUNTER SYMPTOMS
ABDOMINAL PAIN: 0
EYE REDNESS: 0
TROUBLE SWALLOWING: 0
NERVOUS/ANXIOUS: 0
WHEEZING: 0
ACTIVITY CHANGE: 0
PHOTOPHOBIA: 0
SLEEP DISTURBANCE: 0
CHEST TIGHTNESS: 0
SINUS PRESSURE: 0
SEIZURES: 0
SORE THROAT: 0
DIARRHEA: 0
BACK PAIN: 0
NUMBNESS: 0
CONSTIPATION: 0
FATIGUE: 0
AGITATION: 0
DIZZINESS: 0
SINUS PAIN: 0
EYE PAIN: 0
FEVER: 0
NAUSEA: 0
SHORTNESS OF BREATH: 0
HEADACHES: 0

## 2022-03-22 ENCOUNTER — OFFICE VISIT (OUTPATIENT)
Dept: SPORTS MEDICINE | Age: 52
End: 2022-03-22

## 2022-03-22 ENCOUNTER — IMAGING SERVICES (OUTPATIENT)
Dept: GENERAL RADIOLOGY | Age: 52
End: 2022-03-22
Attending: FAMILY MEDICINE

## 2022-03-22 VITALS — BODY MASS INDEX: 31.71 KG/M2 | HEIGHT: 67 IN | WEIGHT: 202 LBS

## 2022-03-22 DIAGNOSIS — M25.531 RIGHT WRIST PAIN: ICD-10-CM

## 2022-03-22 DIAGNOSIS — S62.111D CLOSED CHIP FRACTURE OF RIGHT TRIQUETRUM WITH ROUTINE HEALING, SUBSEQUENT ENCOUNTER: Primary | ICD-10-CM

## 2022-03-22 DIAGNOSIS — S62.111D CLOSED CHIP FRACTURE OF RIGHT TRIQUETRUM WITH ROUTINE HEALING, SUBSEQUENT ENCOUNTER: ICD-10-CM

## 2022-03-22 DIAGNOSIS — M25.631 DECREASED RANGE OF MOTION OF RIGHT WRIST: ICD-10-CM

## 2022-03-22 PROCEDURE — 99213 OFFICE O/P EST LOW 20 MIN: CPT | Performed by: FAMILY MEDICINE

## 2022-03-22 PROCEDURE — 73110 X-RAY EXAM OF WRIST: CPT | Performed by: FAMILY MEDICINE

## 2022-03-22 PROCEDURE — L3908 WHO COCK-UP NONMOLDE PRE OTS: HCPCS | Performed by: FAMILY MEDICINE

## 2022-03-22 ASSESSMENT — ENCOUNTER SYMPTOMS
WOUND: 0
CHILLS: 0

## 2022-03-24 ENCOUNTER — OFFICE VISIT (OUTPATIENT)
Dept: OCCUPATIONAL THERAPY | Age: 52
End: 2022-03-24

## 2022-03-24 DIAGNOSIS — M79.641 RIGHT HAND PAIN: ICD-10-CM

## 2022-03-24 DIAGNOSIS — M25.531 RIGHT WRIST PAIN: ICD-10-CM

## 2022-03-24 PROCEDURE — 97140 MANUAL THERAPY 1/> REGIONS: CPT | Performed by: OCCUPATIONAL THERAPIST

## 2022-03-24 PROCEDURE — 97110 THERAPEUTIC EXERCISES: CPT | Performed by: OCCUPATIONAL THERAPIST

## 2022-03-24 PROCEDURE — 97166 OT EVAL MOD COMPLEX 45 MIN: CPT | Performed by: OCCUPATIONAL THERAPIST

## 2022-03-24 ASSESSMENT — ENCOUNTER SYMPTOMS
QUALITY: SORE
ALLEVIATING FACTORS: REST
QUALITY: ACHE
SUBJECTIVE PAIN PROGRESSION: IMPROVED
PAIN FREQUENCY: INTERMITTENT
QUALITY: SHARP
PAIN SCALE AT HIGHEST: 7
ALLEVIATING FACTOR: MOTRIN
QUALITY: STIFF
PAIN SEVERITY NOW: 0

## 2022-03-29 ENCOUNTER — OFFICE VISIT (OUTPATIENT)
Dept: OCCUPATIONAL THERAPY | Age: 52
End: 2022-03-29

## 2022-03-29 DIAGNOSIS — M79.641 RIGHT HAND PAIN: ICD-10-CM

## 2022-03-29 DIAGNOSIS — M25.531 RIGHT WRIST PAIN: Primary | ICD-10-CM

## 2022-03-29 PROCEDURE — 97140 MANUAL THERAPY 1/> REGIONS: CPT | Performed by: OCCUPATIONAL THERAPIST

## 2022-03-29 PROCEDURE — 97112 NEUROMUSCULAR REEDUCATION: CPT | Performed by: OCCUPATIONAL THERAPIST

## 2022-03-29 PROCEDURE — 97110 THERAPEUTIC EXERCISES: CPT | Performed by: OCCUPATIONAL THERAPIST

## 2022-03-29 ASSESSMENT — ENCOUNTER SYMPTOMS
PAIN SEVERITY NOW: 4
QUALITY: STIFF
QUALITY: SHARP
ALLEVIATING FACTORS: REST
QUALITY: ACHE

## 2022-03-31 ENCOUNTER — APPOINTMENT (OUTPATIENT)
Dept: OCCUPATIONAL THERAPY | Age: 52
End: 2022-03-31

## 2022-04-01 ENCOUNTER — OFFICE VISIT (OUTPATIENT)
Dept: OCCUPATIONAL THERAPY | Age: 52
End: 2022-04-01

## 2022-04-01 DIAGNOSIS — M79.641 RIGHT HAND PAIN: ICD-10-CM

## 2022-04-01 DIAGNOSIS — M25.531 RIGHT WRIST PAIN: ICD-10-CM

## 2022-04-01 PROCEDURE — 97112 NEUROMUSCULAR REEDUCATION: CPT | Performed by: OCCUPATIONAL THERAPIST

## 2022-04-01 PROCEDURE — 97110 THERAPEUTIC EXERCISES: CPT | Performed by: OCCUPATIONAL THERAPIST

## 2022-04-01 PROCEDURE — 97140 MANUAL THERAPY 1/> REGIONS: CPT | Performed by: OCCUPATIONAL THERAPIST

## 2022-04-01 ASSESSMENT — ENCOUNTER SYMPTOMS
QUALITY: SHARP
QUALITY: ACHE
PAIN LOCATION: RIGHT WRIST AND FOREARM
PAIN SEVERITY NOW: 0

## 2022-04-05 ENCOUNTER — OFFICE VISIT (OUTPATIENT)
Dept: OCCUPATIONAL THERAPY | Age: 52
End: 2022-04-05

## 2022-04-05 DIAGNOSIS — M25.531 RIGHT WRIST PAIN: Primary | ICD-10-CM

## 2022-04-05 DIAGNOSIS — M79.641 RIGHT HAND PAIN: ICD-10-CM

## 2022-04-05 PROCEDURE — 97112 NEUROMUSCULAR REEDUCATION: CPT | Performed by: OCCUPATIONAL THERAPIST

## 2022-04-05 PROCEDURE — 97140 MANUAL THERAPY 1/> REGIONS: CPT | Performed by: OCCUPATIONAL THERAPIST

## 2022-04-05 PROCEDURE — 97110 THERAPEUTIC EXERCISES: CPT | Performed by: OCCUPATIONAL THERAPIST

## 2022-04-05 ASSESSMENT — ENCOUNTER SYMPTOMS: PAIN: 1

## 2022-04-07 ENCOUNTER — OFFICE VISIT (OUTPATIENT)
Dept: OCCUPATIONAL THERAPY | Age: 52
End: 2022-04-07

## 2022-04-07 DIAGNOSIS — M25.531 RIGHT WRIST PAIN: Primary | ICD-10-CM

## 2022-04-07 DIAGNOSIS — M79.641 RIGHT HAND PAIN: ICD-10-CM

## 2022-04-07 PROCEDURE — 97140 MANUAL THERAPY 1/> REGIONS: CPT | Performed by: OCCUPATIONAL THERAPIST

## 2022-04-07 PROCEDURE — 97110 THERAPEUTIC EXERCISES: CPT | Performed by: OCCUPATIONAL THERAPIST

## 2022-04-07 PROCEDURE — 97112 NEUROMUSCULAR REEDUCATION: CPT | Performed by: OCCUPATIONAL THERAPIST

## 2022-04-07 ASSESSMENT — ENCOUNTER SYMPTOMS
QUALITY: ACHE
ALLEVIATING FACTORS: MASSAGE
ALLEVIATING FACTORS: REST
PAIN SEVERITY NOW: 3
PAIN LOCATION: RIGHT ULNAR WRIST

## 2022-04-12 ENCOUNTER — OFFICE VISIT (OUTPATIENT)
Dept: OCCUPATIONAL THERAPY | Age: 52
End: 2022-04-12

## 2022-04-12 DIAGNOSIS — M79.641 RIGHT HAND PAIN: ICD-10-CM

## 2022-04-12 DIAGNOSIS — M25.531 RIGHT WRIST PAIN: Primary | ICD-10-CM

## 2022-04-12 PROCEDURE — 97140 MANUAL THERAPY 1/> REGIONS: CPT | Performed by: OCCUPATIONAL THERAPIST

## 2022-04-12 PROCEDURE — 97112 NEUROMUSCULAR REEDUCATION: CPT | Performed by: OCCUPATIONAL THERAPIST

## 2022-04-12 PROCEDURE — 97110 THERAPEUTIC EXERCISES: CPT | Performed by: OCCUPATIONAL THERAPIST

## 2022-04-14 ENCOUNTER — APPOINTMENT (OUTPATIENT)
Dept: OCCUPATIONAL THERAPY | Age: 52
End: 2022-04-14

## 2022-04-24 ENCOUNTER — MOBILE ENCOUNTER (OUTPATIENT)
Dept: INTERNAL MEDICINE CLINIC | Facility: CLINIC | Age: 52
End: 2022-04-24

## 2022-04-25 ENCOUNTER — TELEPHONE (OUTPATIENT)
Dept: INTERNAL MEDICINE CLINIC | Facility: CLINIC | Age: 52
End: 2022-04-25

## 2022-04-25 ASSESSMENT — ENCOUNTER SYMPTOMS
CONSTIPATION: 0
NERVOUS/ANXIOUS: 0
SINUS PRESSURE: 0
WHEEZING: 0
EYE PAIN: 0
CHEST TIGHTNESS: 0
SEIZURES: 0
NUMBNESS: 0
DIARRHEA: 0
SHORTNESS OF BREATH: 0
NAUSEA: 0
EYE REDNESS: 0
TROUBLE SWALLOWING: 0
SINUS PAIN: 0
HEADACHES: 0
DIZZINESS: 0
SLEEP DISTURBANCE: 0
AGITATION: 0
BACK PAIN: 0
ACTIVITY CHANGE: 0
ABDOMINAL PAIN: 0
FATIGUE: 0
PHOTOPHOBIA: 0
FEVER: 0
SORE THROAT: 0

## 2022-04-25 NOTE — TELEPHONE ENCOUNTER
Received on call page from patient on 4/24/22 that she tested positive for COVID-19. Symptoms began 4/22/22. Currently c/o fever (up to 103), sore throat, mild cough, hoarse voice. Denies chest pain, SOB. She is on immunosuppressive medication (MTX, azathioprine, Plaquenil) for RA & Sjogren's. Has already spoken to her rheumatologist who has asked her to hold meds at this time. Pt will start paxlovid. Discussed possible SEs. Will hold nortriptyline while on this d/t drug interaction. Discussed CDC recs for isolation and S&S which should prompt going to the ED.

## 2022-04-26 ENCOUNTER — APPOINTMENT (OUTPATIENT)
Dept: SPORTS MEDICINE | Age: 52
End: 2022-04-26

## 2022-05-05 ASSESSMENT — ENCOUNTER SYMPTOMS
DIARRHEA: 0
CHEST TIGHTNESS: 0
SHORTNESS OF BREATH: 0
SINUS PRESSURE: 0
ACTIVITY CHANGE: 0
CONSTIPATION: 0
SORE THROAT: 0
AGITATION: 0
WHEEZING: 0
FEVER: 0
SLEEP DISTURBANCE: 0
NAUSEA: 0
EYE REDNESS: 0
NERVOUS/ANXIOUS: 0
FATIGUE: 0
ABDOMINAL PAIN: 0
BACK PAIN: 0
EYE PAIN: 0
HEADACHES: 0
NUMBNESS: 0
SEIZURES: 0
PHOTOPHOBIA: 0
TROUBLE SWALLOWING: 0
SINUS PAIN: 0
DIZZINESS: 0

## 2022-05-06 RX ORDER — PROPRANOLOL HYDROCHLORIDE 80 MG/1
CAPSULE, EXTENDED RELEASE ORAL
Qty: 90 CAPSULE | Refills: 3 | Status: SHIPPED | OUTPATIENT
Start: 2022-05-06

## 2022-05-10 ENCOUNTER — IMAGING SERVICES (OUTPATIENT)
Dept: GENERAL RADIOLOGY | Age: 52
End: 2022-05-10
Attending: FAMILY MEDICINE

## 2022-05-10 ENCOUNTER — TELEPHONE (OUTPATIENT)
Dept: SPORTS MEDICINE | Age: 52
End: 2022-05-10

## 2022-05-10 ENCOUNTER — OFFICE VISIT (OUTPATIENT)
Dept: SPORTS MEDICINE | Age: 52
End: 2022-05-10

## 2022-05-10 VITALS
WEIGHT: 202 LBS | SYSTOLIC BLOOD PRESSURE: 114 MMHG | BODY MASS INDEX: 31.71 KG/M2 | DIASTOLIC BLOOD PRESSURE: 78 MMHG | HEIGHT: 67 IN | HEART RATE: 65 BPM

## 2022-05-10 DIAGNOSIS — M25.631 DECREASED RANGE OF MOTION OF RIGHT WRIST: ICD-10-CM

## 2022-05-10 DIAGNOSIS — S62.111D CLOSED CHIP FRACTURE OF RIGHT TRIQUETRUM WITH ROUTINE HEALING, SUBSEQUENT ENCOUNTER: ICD-10-CM

## 2022-05-10 DIAGNOSIS — S69.81XA INJURY OF TRIANGULAR FIBROCARTILAGE COMPLEX (TFCC) OF RIGHT WRIST, INITIAL ENCOUNTER: Primary | ICD-10-CM

## 2022-05-10 PROCEDURE — 99214 OFFICE O/P EST MOD 30 MIN: CPT | Performed by: FAMILY MEDICINE

## 2022-05-10 PROCEDURE — 73110 X-RAY EXAM OF WRIST: CPT | Performed by: FAMILY MEDICINE

## 2022-05-10 ASSESSMENT — ENCOUNTER SYMPTOMS: WOUND: 0

## 2022-05-12 ENCOUNTER — HOSPITAL ENCOUNTER (OUTPATIENT)
Dept: MRI IMAGING | Age: 52
Discharge: HOME | End: 2022-05-12
Attending: FAMILY MEDICINE

## 2022-05-12 DIAGNOSIS — S69.81XA INJURY OF TRIANGULAR FIBROCARTILAGE COMPLEX (TFCC) OF RIGHT WRIST, INITIAL ENCOUNTER: ICD-10-CM

## 2022-05-12 DIAGNOSIS — S62.111D CLOSED CHIP FRACTURE OF RIGHT TRIQUETRUM WITH ROUTINE HEALING, SUBSEQUENT ENCOUNTER: ICD-10-CM

## 2022-05-12 DIAGNOSIS — M25.631 DECREASED RANGE OF MOTION OF RIGHT WRIST: ICD-10-CM

## 2022-05-12 PROCEDURE — 73221 MRI JOINT UPR EXTREM W/O DYE: CPT

## 2022-05-12 PROCEDURE — G1004 CDSM NDSC: HCPCS

## 2022-05-13 ENCOUNTER — TELEPHONE (OUTPATIENT)
Dept: SPORTS MEDICINE | Age: 52
End: 2022-05-13

## 2022-05-13 ASSESSMENT — ENCOUNTER SYMPTOMS
DIARRHEA: 0
SHORTNESS OF BREATH: 0
HEADACHES: 0
NAUSEA: 0
PHOTOPHOBIA: 0
FATIGUE: 0
ACTIVITY CHANGE: 0
ABDOMINAL PAIN: 0
WHEEZING: 0
CHEST TIGHTNESS: 0
SLEEP DISTURBANCE: 0
TROUBLE SWALLOWING: 0
NUMBNESS: 0
EYE REDNESS: 0
NERVOUS/ANXIOUS: 0
SORE THROAT: 0
BACK PAIN: 0
FEVER: 0
DIZZINESS: 0
SEIZURES: 0
CONSTIPATION: 0

## 2022-05-17 ENCOUNTER — OFFICE VISIT (OUTPATIENT)
Dept: SPORTS MEDICINE | Age: 52
End: 2022-05-17

## 2022-05-17 VITALS — WEIGHT: 202 LBS | BODY MASS INDEX: 31.71 KG/M2 | HEIGHT: 67 IN

## 2022-05-17 DIAGNOSIS — S62.111D CLOSED CHIP FRACTURE OF RIGHT TRIQUETRUM WITH ROUTINE HEALING, SUBSEQUENT ENCOUNTER: Primary | ICD-10-CM

## 2022-05-17 DIAGNOSIS — M25.631 DECREASED RANGE OF MOTION OF RIGHT WRIST: ICD-10-CM

## 2022-05-17 DIAGNOSIS — S69.81XD INJURY OF TRIANGULAR FIBROCARTILAGE COMPLEX (TFCC) OF RIGHT WRIST, SUBSEQUENT ENCOUNTER: ICD-10-CM

## 2022-05-17 PROCEDURE — 99213 OFFICE O/P EST LOW 20 MIN: CPT | Performed by: FAMILY MEDICINE

## 2022-05-17 ASSESSMENT — ENCOUNTER SYMPTOMS
SINUS PAIN: 0
SINUS PRESSURE: 0
EYE PAIN: 0
WOUND: 0
AGITATION: 0

## 2022-05-23 ENCOUNTER — HOSPITAL ENCOUNTER (OUTPATIENT)
Dept: PHYSICAL MEDICINE AND REHAB | Age: 52
Discharge: STILL ADMITTED OR ADMINISTRATIVE | End: 2022-05-23
Attending: FAMILY MEDICINE

## 2022-05-23 DIAGNOSIS — S69.81XD INJURY OF TRIANGULAR FIBROCARTILAGE COMPLEX (TFCC) OF RIGHT WRIST, SUBSEQUENT ENCOUNTER: ICD-10-CM

## 2022-05-23 DIAGNOSIS — M25.631 DECREASED RANGE OF MOTION OF RIGHT WRIST: ICD-10-CM

## 2022-05-23 PROCEDURE — L3702 EO W/O JOINTS CF: HCPCS | Performed by: OCCUPATIONAL THERAPIST

## 2022-05-23 PROCEDURE — 97165 OT EVAL LOW COMPLEX 30 MIN: CPT | Performed by: OCCUPATIONAL THERAPIST

## 2022-05-27 ENCOUNTER — OFFICE VISIT (OUTPATIENT)
Dept: INTERNAL MEDICINE CLINIC | Facility: CLINIC | Age: 52
End: 2022-05-27
Payer: COMMERCIAL

## 2022-05-27 ENCOUNTER — APPOINTMENT (OUTPATIENT)
Dept: PHYSICAL MEDICINE AND REHAB | Age: 52
End: 2022-05-27
Attending: FAMILY MEDICINE

## 2022-05-27 VITALS
SYSTOLIC BLOOD PRESSURE: 120 MMHG | RESPIRATION RATE: 16 BRPM | TEMPERATURE: 98 F | HEIGHT: 68 IN | HEART RATE: 67 BPM | DIASTOLIC BLOOD PRESSURE: 80 MMHG | OXYGEN SATURATION: 98 % | WEIGHT: 197.19 LBS | BODY MASS INDEX: 29.88 KG/M2

## 2022-05-27 DIAGNOSIS — Z00.00 ROUTINE GENERAL MEDICAL EXAMINATION AT A HEALTH CARE FACILITY: Primary | ICD-10-CM

## 2022-05-27 PROBLEM — Z12.11 SPECIAL SCREENING FOR MALIGNANT NEOPLASMS, COLON: Status: RESOLVED | Noted: 2020-09-15 | Resolved: 2022-05-27

## 2022-05-27 PROCEDURE — 3074F SYST BP LT 130 MM HG: CPT | Performed by: INTERNAL MEDICINE

## 2022-05-27 PROCEDURE — 3079F DIAST BP 80-89 MM HG: CPT | Performed by: INTERNAL MEDICINE

## 2022-05-27 PROCEDURE — 3008F BODY MASS INDEX DOCD: CPT | Performed by: INTERNAL MEDICINE

## 2022-05-27 PROCEDURE — 99396 PREV VISIT EST AGE 40-64: CPT | Performed by: INTERNAL MEDICINE

## 2022-05-27 RX ORDER — PROPRANOLOL HYDROCHLORIDE 120 MG/1
120 CAPSULE, EXTENDED RELEASE ORAL DAILY
Qty: 90 CAPSULE | Refills: 1 | Status: SHIPPED | OUTPATIENT
Start: 2022-05-27

## 2022-05-30 ASSESSMENT — ENCOUNTER SYMPTOMS
QUALITY: DISCOMFORT
QUALITY: TINGLING
QUALITY: NUMBNESS
PAIN LOCATION: R WRIST/HAND
QUALITY: SHARP
PAIN SEVERITY NOW: 7
SUBJECTIVE PAIN PROGRESSION: WORSENING
ALLEVIATING FACTORS: UNABLE TO STATE ANYTHING THAT HELPS REDUCE THE PAIN

## 2022-06-01 ENCOUNTER — OFFICE VISIT (OUTPATIENT)
Dept: ORTHOPEDICS | Age: 52
End: 2022-06-01

## 2022-06-01 VITALS — WEIGHT: 195 LBS | BODY MASS INDEX: 30.61 KG/M2 | HEIGHT: 67 IN

## 2022-06-01 DIAGNOSIS — M24.131 DEGENERATIVE TEAR OF TRIANGULAR FIBROCARTILAGE COMPLEX (TFCC) OF RIGHT WRIST: Primary | ICD-10-CM

## 2022-06-01 PROCEDURE — 99203 OFFICE O/P NEW LOW 30 MIN: CPT | Performed by: ORTHOPAEDIC SURGERY

## 2022-06-01 RX ORDER — TOPIRAMATE 50 MG/1
50 TABLET, FILM COATED ORAL
COMMUNITY
End: 2022-09-14 | Stop reason: ALTCHOICE

## 2022-06-01 RX ORDER — GABAPENTIN 300 MG/1
CAPSULE ORAL
COMMUNITY
Start: 2022-05-24

## 2022-06-01 RX ORDER — AZATHIOPRINE 50 MG/1
TABLET ORAL
COMMUNITY
Start: 2022-05-24

## 2022-06-01 RX ORDER — FOLIC ACID 1 MG/1
TABLET ORAL
COMMUNITY
Start: 2022-02-22

## 2022-06-01 RX ORDER — CYCLOBENZAPRINE HCL 5 MG
5 TABLET ORAL
COMMUNITY
Start: 2022-02-24 | End: 2022-09-14 | Stop reason: ALTCHOICE

## 2022-06-14 NOTE — PROGRESS NOTES
Clara Beard is a 47year old female. HPI:   Patient presents for a physical exam and multiple issues. Knows she will be billed for TWO visits. 1. Lupus: got 2nd opinion and plans to follow with Dr Sav Nascimento.  Her MTX was decreased from 10 tabs to 9 tabs Yes is coping well with covid pandemic.  She lives with 9 people so has a good support team  EXT: denies edema      Wt Readings from Last 6 Encounters:  02/28/20 : 188 lb (85.3 kg)  02/17/20 : 188 lb (85.3 kg)  01/24/20 : 194 lb (88 kg)  01/23/20 : 194 lb (88 k nondistended no hsm bs throughout  NEURO: CN 2-12 grossly intact  PSYCH: pleasant  EXTREMITIES: no cyanosis, clubbing or edema  Left axilla: no masses or lad noted  Pelvic: normal, healthy appearing cervix  Medical assistant, Waldo Lawson, was present for a Breast Cancer Screening:  bilateral mastectomy  Bone Health/Fall Prevention ([place]): educated on dietary calcium/vit D3, weight bearing exercises and fall prevention  Vaccines: cont yearly flu shot.  TDAP and shingrix today and pneumovac 23 today  Medic

## 2022-07-05 ENCOUNTER — OFFICE VISIT (OUTPATIENT)
Dept: ORTHOPEDICS | Age: 52
End: 2022-07-05

## 2022-07-05 VITALS — WEIGHT: 195 LBS | HEIGHT: 67 IN | BODY MASS INDEX: 30.61 KG/M2

## 2022-07-05 DIAGNOSIS — M24.131 DEGENERATIVE TEAR OF TRIANGULAR FIBROCARTILAGE COMPLEX (TFCC) OF RIGHT WRIST: Primary | ICD-10-CM

## 2022-07-05 PROCEDURE — 99213 OFFICE O/P EST LOW 20 MIN: CPT | Performed by: ORTHOPAEDIC SURGERY

## 2022-07-05 PROCEDURE — 20606 DRAIN/INJ JOINT/BURSA W/US: CPT | Performed by: ORTHOPAEDIC SURGERY

## 2022-07-05 RX ADMIN — TRIAMCINOLONE ACETONIDE 40 MG: 40 INJECTION, SUSPENSION INTRA-ARTICULAR; INTRAMUSCULAR at 16:49

## 2022-07-05 RX ADMIN — LIDOCAINE HYDROCHLORIDE 4 ML: 10 INJECTION, SOLUTION INFILTRATION; PERINEURAL at 16:49

## 2022-07-06 RX ORDER — TRIAMCINOLONE ACETONIDE 40 MG/ML
40 INJECTION, SUSPENSION INTRA-ARTICULAR; INTRAMUSCULAR
Status: COMPLETED | OUTPATIENT
Start: 2022-07-05 | End: 2022-07-05

## 2022-07-06 RX ORDER — LIDOCAINE HYDROCHLORIDE 10 MG/ML
4 INJECTION, SOLUTION INFILTRATION; PERINEURAL
Status: COMPLETED | OUTPATIENT
Start: 2022-07-05 | End: 2022-07-05

## 2022-08-23 ENCOUNTER — OFFICE VISIT (OUTPATIENT)
Dept: ORTHOPEDICS | Age: 52
End: 2022-08-23

## 2022-08-23 ENCOUNTER — APPOINTMENT (OUTPATIENT)
Dept: ORTHOPEDICS | Age: 52
End: 2022-08-23

## 2022-08-23 ENCOUNTER — TELEPHONE (OUTPATIENT)
Dept: ORTHOPEDICS | Age: 52
End: 2022-08-23

## 2022-08-23 DIAGNOSIS — M24.131 DEGENERATIVE TEAR OF TRIANGULAR FIBROCARTILAGE COMPLEX (TFCC) OF RIGHT WRIST: Primary | ICD-10-CM

## 2022-08-23 PROCEDURE — 99214 OFFICE O/P EST MOD 30 MIN: CPT | Performed by: ORTHOPAEDIC SURGERY

## 2022-08-26 ENCOUNTER — TELEPHONE (OUTPATIENT)
Dept: INTERNAL MEDICINE CLINIC | Facility: CLINIC | Age: 52
End: 2022-08-26

## 2022-08-26 ENCOUNTER — PREP FOR CASE (OUTPATIENT)
Dept: SPORTS MEDICINE | Age: 52
End: 2022-08-26

## 2022-08-26 DIAGNOSIS — S63.591A TFCC (TRIANGULAR FIBROCARTILAGE COMPLEX) TEAR, RIGHT, INITIAL ENCOUNTER: Primary | ICD-10-CM

## 2022-08-26 RX ORDER — ACETAMINOPHEN 500 MG
1000 TABLET ORAL
Status: CANCELLED | OUTPATIENT
Start: 2022-08-26

## 2022-08-30 ENCOUNTER — EXTERNAL RECORD (OUTPATIENT)
Dept: HEALTH INFORMATION MANAGEMENT | Facility: OTHER | Age: 52
End: 2022-08-30

## 2022-08-30 ENCOUNTER — OFFICE VISIT (OUTPATIENT)
Dept: INTERNAL MEDICINE CLINIC | Facility: CLINIC | Age: 52
End: 2022-08-30
Payer: COMMERCIAL

## 2022-08-30 ENCOUNTER — TELEPHONE (OUTPATIENT)
Dept: INTERNAL MEDICINE CLINIC | Facility: CLINIC | Age: 52
End: 2022-08-30

## 2022-08-30 VITALS
OXYGEN SATURATION: 99 % | BODY MASS INDEX: 29.8 KG/M2 | SYSTOLIC BLOOD PRESSURE: 100 MMHG | HEART RATE: 62 BPM | TEMPERATURE: 98 F | DIASTOLIC BLOOD PRESSURE: 69 MMHG | RESPIRATION RATE: 16 BRPM | HEIGHT: 68 IN | WEIGHT: 196.63 LBS

## 2022-08-30 DIAGNOSIS — Z01.818 PRE-OP EXAM: ICD-10-CM

## 2022-08-30 DIAGNOSIS — M79.7 FIBROMYALGIA: ICD-10-CM

## 2022-08-30 DIAGNOSIS — G43.709 CHRONIC MIGRAINE WITHOUT AURA WITHOUT STATUS MIGRAINOSUS, NOT INTRACTABLE: ICD-10-CM

## 2022-08-30 DIAGNOSIS — M32.8 OTHER FORMS OF SYSTEMIC LUPUS ERYTHEMATOSUS, UNSPECIFIED ORGAN INVOLVEMENT STATUS (HCC): ICD-10-CM

## 2022-08-30 DIAGNOSIS — D53.9 MACROCYTIC ANEMIA: ICD-10-CM

## 2022-08-30 DIAGNOSIS — M25.531 RIGHT WRIST PAIN: Primary | ICD-10-CM

## 2022-08-30 PROCEDURE — 93000 ELECTROCARDIOGRAM COMPLETE: CPT | Performed by: INTERNAL MEDICINE

## 2022-08-30 PROCEDURE — 3074F SYST BP LT 130 MM HG: CPT | Performed by: INTERNAL MEDICINE

## 2022-08-30 PROCEDURE — 99244 OFF/OP CNSLTJ NEW/EST MOD 40: CPT | Performed by: INTERNAL MEDICINE

## 2022-08-30 PROCEDURE — 3078F DIAST BP <80 MM HG: CPT | Performed by: INTERNAL MEDICINE

## 2022-08-30 PROCEDURE — 3008F BODY MASS INDEX DOCD: CPT | Performed by: INTERNAL MEDICINE

## 2022-08-30 RX ORDER — ORPHENADRINE CITRATE 100 MG/1
100 TABLET, EXTENDED RELEASE ORAL 2 TIMES DAILY PRN
COMMUNITY
Start: 2022-08-03

## 2022-08-30 RX ORDER — NORTRIPTYLINE HYDROCHLORIDE 25 MG/1
25 CAPSULE ORAL EVERY EVENING
COMMUNITY
Start: 2022-08-02

## 2022-08-30 NOTE — PATIENT INSTRUCTIONS
Please use 100 mg sumatriptan for abortive therapy of your migraine onset. If still experiencing symptoms 2 hours later can then repeat with 25-50 mg of sumatriptan. You can use up to 200 mg in 24 hours.

## 2022-08-30 NOTE — TELEPHONE ENCOUNTER
Faxed pre-op progress notes and EKG report to [address] [facility]- att [place] at [phone]. Received confirmation. EKG report copy sent to scan.  Ppw placed in accordion folder in POD#2

## 2022-08-31 NOTE — TELEPHONE ENCOUNTER
Please inform surgical staff we do not \"clear\" patients for surgery as this is not a clinically acceptable term. All patients have a risk from any operation so it is our job to clarify whether that risk is acceptable or not. I have added that she is optimized though.

## 2022-08-31 NOTE — TELEPHONE ENCOUNTER
Enzo Tinajero surgery scheduler from dr. Tammy Cisneros stated the H&P clearance has to specifically specify pt is cleared or optimized for surgery, according to the one they got dr. Neel Oh wrote acceptable risk and that is not acceptable. Please re send H&P with the modifications requested. Fax # [phone] any questions please call Enzo Tinajero at [phone].  Pt's surgery date: 09-16-22

## 2022-08-31 NOTE — TELEPHONE ENCOUNTER
Relayed KS message below to [facility] and faxed over updated progress notes at [phone]. Received confirmation. Task completed.

## 2022-09-01 NOTE — TELEPHONE ENCOUNTER
[place] with Dr Renee [facility] office stated they received the H&P from yesterday but they need H&P to also list pt current medications.      Fax - [phone], Ashley Chavez

## 2022-09-13 ENCOUNTER — LAB SERVICES (OUTPATIENT)
Dept: LAB | Age: 52
End: 2022-09-13

## 2022-09-13 DIAGNOSIS — S63.591A TFCC (TRIANGULAR FIBROCARTILAGE COMPLEX) TEAR, RIGHT, INITIAL ENCOUNTER: ICD-10-CM

## 2022-09-13 LAB
SARS-COV-2 RNA RESP QL NAA+PROBE: NOT DETECTED
SERVICE CMNT-IMP: NORMAL
SERVICE CMNT-IMP: NORMAL

## 2022-09-13 PROCEDURE — U0005 INFEC AGEN DETEC AMPLI PROBE: HCPCS | Performed by: ORTHOPAEDIC SURGERY

## 2022-09-13 PROCEDURE — U0003 INFECTIOUS AGENT DETECTION BY NUCLEIC ACID (DNA OR RNA); SEVERE ACUTE RESPIRATORY SYNDROME CORONAVIRUS 2 (SARS-COV-2) (CORONAVIRUS DISEASE [COVID-19]), AMPLIFIED PROBE TECHNIQUE, MAKING USE OF HIGH THROUGHPUT TECHNOLOGIES AS DESCRIBED BY CMS-2020-01-R: HCPCS | Performed by: ORTHOPAEDIC SURGERY

## 2022-09-14 ASSESSMENT — ACTIVITIES OF DAILY LIVING (ADL)
ADL_SHORT_OF_BREATH: NO
NEEDS_ASSIST: NO
RECENT_DECLINE_ADL: NO
ADL_SCORE: 12
ADL_BEFORE_ADMISSION: INDEPENDENT
SENSORY_SUPPORT_DEVICES: EYEGLASSES
HISTORY OF FALLING IN THE LAST YEAR (PRIOR TO ADMISSION): NO

## 2022-09-14 ASSESSMENT — COGNITIVE AND FUNCTIONAL STATUS - GENERAL
ARE YOU BLIND OR DO YOU HAVE SERIOUS DIFFICULTY SEEING, EVEN WHEN WEARING GLASSES: NO
ARE YOU DEAF OR DO YOU HAVE SERIOUS DIFFICULTY  HEARING: NO

## 2022-09-16 ENCOUNTER — ANESTHESIA EVENT (OUTPATIENT)
Dept: SURGERY | Age: 52
End: 2022-09-16

## 2022-09-16 ENCOUNTER — HOSPITAL ENCOUNTER (OUTPATIENT)
Age: 52
Discharge: HOME | End: 2022-09-16
Attending: ORTHOPAEDIC SURGERY | Admitting: ORTHOPAEDIC SURGERY

## 2022-09-16 ENCOUNTER — ANESTHESIA (OUTPATIENT)
Dept: SURGERY | Age: 52
End: 2022-09-16

## 2022-09-16 DIAGNOSIS — M24.131 DEGENERATIVE TEAR OF TRIANGULAR FIBROCARTILAGE COMPLEX (TFCC) OF RIGHT WRIST: Primary | ICD-10-CM

## 2022-09-16 DIAGNOSIS — S63.591A TFCC (TRIANGULAR FIBROCARTILAGE COMPLEX) TEAR, RIGHT, INITIAL ENCOUNTER: ICD-10-CM

## 2022-09-16 PROCEDURE — 29844 WRIST ARTHROSCOPY/SURGERY: CPT | Performed by: ORTHOPAEDIC SURGERY

## 2022-09-16 PROCEDURE — 29846 WRIST ARTHROSCOPY/SURGERY: CPT | Performed by: CLINIC/CENTER

## 2022-09-16 RX ORDER — DEXTROSE MONOHYDRATE 50 MG/ML
INJECTION, SOLUTION INTRAVENOUS CONTINUOUS PRN
Status: DISCONTINUED | OUTPATIENT
Start: 2022-09-16 | End: 2022-09-18 | Stop reason: HOSPADM

## 2022-09-16 RX ORDER — LIDOCAINE HYDROCHLORIDE 10 MG/ML
INJECTION, SOLUTION INFILTRATION; PERINEURAL PRN
Status: DISCONTINUED | OUTPATIENT
Start: 2022-09-16 | End: 2022-09-16

## 2022-09-16 RX ORDER — MIDAZOLAM HYDROCHLORIDE 1 MG/ML
INJECTION, SOLUTION INTRAMUSCULAR; INTRAVENOUS PRN
Status: DISCONTINUED | OUTPATIENT
Start: 2022-09-16 | End: 2022-09-16

## 2022-09-16 RX ORDER — ONDANSETRON 2 MG/ML
INJECTION INTRAMUSCULAR; INTRAVENOUS PRN
Status: DISCONTINUED | OUTPATIENT
Start: 2022-09-16 | End: 2022-09-16

## 2022-09-16 RX ORDER — LIDOCAINE HYDROCHLORIDE 10 MG/ML
5-10 INJECTION, SOLUTION INFILTRATION; PERINEURAL PRN
Status: DISCONTINUED | OUTPATIENT
Start: 2022-09-16 | End: 2022-09-18 | Stop reason: HOSPADM

## 2022-09-16 RX ORDER — HYDROCODONE BITARTRATE AND ACETAMINOPHEN 5; 325 MG/1; MG/1
1 TABLET ORAL EVERY 6 HOURS PRN
Qty: 21 TABLET | Refills: 0 | Status: SHIPPED | OUTPATIENT
Start: 2022-09-16 | End: 2022-10-31 | Stop reason: ALTCHOICE

## 2022-09-16 RX ORDER — DEXAMETHASONE SODIUM PHOSPHATE 4 MG/ML
INJECTION, SOLUTION INTRA-ARTICULAR; INTRALESIONAL; INTRAMUSCULAR; INTRAVENOUS; SOFT TISSUE PRN
Status: DISCONTINUED | OUTPATIENT
Start: 2022-09-16 | End: 2022-09-16

## 2022-09-16 RX ORDER — HYDROCODONE BITARTRATE AND ACETAMINOPHEN 5; 325 MG/1; MG/1
1 TABLET ORAL ONCE
Status: COMPLETED | OUTPATIENT
Start: 2022-09-16 | End: 2022-09-16

## 2022-09-16 RX ORDER — SODIUM CHLORIDE 9 MG/ML
INJECTION, SOLUTION INTRAVENOUS CONTINUOUS
Status: DISCONTINUED | OUTPATIENT
Start: 2022-09-16 | End: 2022-09-18 | Stop reason: HOSPADM

## 2022-09-16 RX ORDER — CLINDAMYCIN PHOSPHATE 900 MG/50ML
900 INJECTION INTRAVENOUS
Status: COMPLETED | OUTPATIENT
Start: 2022-09-16 | End: 2022-09-16

## 2022-09-16 RX ORDER — SODIUM CHLORIDE, SODIUM LACTATE, POTASSIUM CHLORIDE, CALCIUM CHLORIDE 600; 310; 30; 20 MG/100ML; MG/100ML; MG/100ML; MG/100ML
INJECTION, SOLUTION INTRAVENOUS CONTINUOUS PRN
Status: DISCONTINUED | OUTPATIENT
Start: 2022-09-16 | End: 2022-09-18 | Stop reason: HOSPADM

## 2022-09-16 RX ORDER — SODIUM CHLORIDE, SODIUM LACTATE, POTASSIUM CHLORIDE, CALCIUM CHLORIDE 600; 310; 30; 20 MG/100ML; MG/100ML; MG/100ML; MG/100ML
INJECTION, SOLUTION INTRAVENOUS CONTINUOUS
Status: DISCONTINUED | OUTPATIENT
Start: 2022-09-16 | End: 2022-09-18 | Stop reason: HOSPADM

## 2022-09-16 RX ORDER — 0.9 % SODIUM CHLORIDE 0.9 %
VIAL (ML) INJECTION PRN
Status: DISCONTINUED | OUTPATIENT
Start: 2022-09-16 | End: 2022-09-18 | Stop reason: HOSPADM

## 2022-09-16 RX ORDER — MAGNESIUM HYDROXIDE 1200 MG/15ML
LIQUID ORAL PRN
Status: DISCONTINUED | OUTPATIENT
Start: 2022-09-16 | End: 2022-09-18 | Stop reason: HOSPADM

## 2022-09-16 RX ORDER — ACETAMINOPHEN 500 MG
1000 TABLET ORAL
Status: DISCONTINUED | OUTPATIENT
Start: 2022-09-16 | End: 2022-09-18 | Stop reason: HOSPADM

## 2022-09-16 RX ORDER — PROPOFOL 10 MG/ML
INJECTION, EMULSION INTRAVENOUS PRN
Status: DISCONTINUED | OUTPATIENT
Start: 2022-09-16 | End: 2022-09-16

## 2022-09-16 RX ADMIN — LIDOCAINE HYDROCHLORIDE 50 MG: 10 INJECTION, SOLUTION INFILTRATION; PERINEURAL at 09:40

## 2022-09-16 RX ADMIN — CLINDAMYCIN PHOSPHATE 900 MG: 900 INJECTION INTRAVENOUS at 09:45

## 2022-09-16 RX ADMIN — ONDANSETRON 4 MG: 2 INJECTION INTRAMUSCULAR; INTRAVENOUS at 10:01

## 2022-09-16 RX ADMIN — MIDAZOLAM HYDROCHLORIDE 2 MG: 1 INJECTION, SOLUTION INTRAMUSCULAR; INTRAVENOUS at 09:32

## 2022-09-16 RX ADMIN — PROPOFOL 200 MG: 10 INJECTION, EMULSION INTRAVENOUS at 09:40

## 2022-09-16 RX ADMIN — HYDROCODONE BITARTRATE AND ACETAMINOPHEN 1 TABLET: 5; 325 TABLET ORAL at 11:37

## 2022-09-16 RX ADMIN — SODIUM CHLORIDE, SODIUM LACTATE, POTASSIUM CHLORIDE, CALCIUM CHLORIDE: 600; 310; 30; 20 INJECTION, SOLUTION INTRAVENOUS at 06:50

## 2022-09-16 RX ADMIN — DEXAMETHASONE SODIUM PHOSPHATE 8 MG: 4 INJECTION, SOLUTION INTRA-ARTICULAR; INTRALESIONAL; INTRAMUSCULAR; INTRAVENOUS; SOFT TISSUE at 10:01

## 2022-09-16 ASSESSMENT — PAIN SCALES - GENERAL
PAINLEVEL_OUTOF10: 0
PAINLEVEL_OUTOF10: 4
PAINLEVEL_OUTOF10: 6
PAINLEVEL_OUTOF10: 0

## 2022-09-16 ASSESSMENT — ENCOUNTER SYMPTOMS
HEADACHES: 1
EXERCISE TOLERANCE: GOOD (>4 METS)

## 2022-09-19 ENCOUNTER — TELEPHONE (OUTPATIENT)
Dept: ORTHOPEDICS | Age: 52
End: 2022-09-19

## 2022-09-19 VITALS
WEIGHT: 194 LBS | TEMPERATURE: 98.2 F | HEART RATE: 65 BPM | BODY MASS INDEX: 29.4 KG/M2 | RESPIRATION RATE: 18 BRPM | SYSTOLIC BLOOD PRESSURE: 114 MMHG | HEIGHT: 68 IN | OXYGEN SATURATION: 100 % | DIASTOLIC BLOOD PRESSURE: 69 MMHG

## 2022-09-19 RX ORDER — ESTRADIOL AND NORETHINDRONE ACETATE .5; .1 MG/1; MG/1
TABLET ORAL
Qty: 84 TABLET | Refills: 3 | Status: SHIPPED | OUTPATIENT
Start: 2022-09-19

## 2022-09-26 ENCOUNTER — APPOINTMENT (OUTPATIENT)
Dept: DERMATOLOGY | Age: 52
End: 2022-09-26

## 2022-09-26 ENCOUNTER — CLINICAL ABSTRACT (OUTPATIENT)
Dept: HEALTH INFORMATION MANAGEMENT | Facility: OTHER | Age: 52
End: 2022-09-26

## 2022-09-27 ENCOUNTER — OFFICE VISIT (OUTPATIENT)
Dept: ORTHOPEDICS | Age: 52
End: 2022-09-27

## 2022-09-27 VITALS — HEIGHT: 68 IN | BODY MASS INDEX: 29.4 KG/M2 | WEIGHT: 194 LBS

## 2022-09-27 DIAGNOSIS — M24.131 DEGENERATIVE TEAR OF TRIANGULAR FIBROCARTILAGE COMPLEX (TFCC) OF RIGHT WRIST: Primary | ICD-10-CM

## 2022-09-27 PROCEDURE — 99024 POSTOP FOLLOW-UP VISIT: CPT | Performed by: PHYSICIAN ASSISTANT

## 2022-10-04 ENCOUNTER — TELEPHONE (OUTPATIENT)
Dept: PHYSICAL THERAPY | Age: 52
End: 2022-10-04

## 2022-10-05 ENCOUNTER — TELEPHONE (OUTPATIENT)
Dept: ORTHOPEDICS | Age: 52
End: 2022-10-05

## 2022-10-05 ASSESSMENT — ENCOUNTER SYMPTOMS
QUALITY: DISCOMFORT
QUALITY: TIGHT
PAIN FREQUENCY: INTERMITTENT
QUALITY: STIFF

## 2022-10-06 ENCOUNTER — TELEPHONE (OUTPATIENT)
Dept: DERMATOLOGY | Age: 52
End: 2022-10-06

## 2022-10-06 ENCOUNTER — OFFICE VISIT (OUTPATIENT)
Dept: PHYSICAL THERAPY | Age: 52
End: 2022-10-06
Attending: PHYSICIAN ASSISTANT

## 2022-10-06 DIAGNOSIS — M24.131 DEGENERATIVE TEAR OF TRIANGULAR FIBROCARTILAGE COMPLEX (TFCC) OF RIGHT WRIST: ICD-10-CM

## 2022-10-06 PROCEDURE — 97110 THERAPEUTIC EXERCISES: CPT | Performed by: OCCUPATIONAL THERAPIST

## 2022-10-06 PROCEDURE — 97166 OT EVAL MOD COMPLEX 45 MIN: CPT | Performed by: OCCUPATIONAL THERAPIST

## 2022-10-06 PROCEDURE — 97140 MANUAL THERAPY 1/> REGIONS: CPT | Performed by: OCCUPATIONAL THERAPIST

## 2022-10-06 ASSESSMENT — ENCOUNTER SYMPTOMS
PAIN SCALE AT HIGHEST: 6
ALLEVIATING FACTORS: ICE
ALLEVIATING FACTORS: REST
ALLEVIATING FACTORS: AVOIDING MOVEMENT IN INVOLVED AREA
PAIN SEVERITY NOW: 0

## 2022-10-10 ASSESSMENT — ENCOUNTER SYMPTOMS
ALLEVIATING FACTORS: REST
ALLEVIATING FACTORS: HEAT

## 2022-10-11 ENCOUNTER — APPOINTMENT (OUTPATIENT)
Dept: ORTHOPEDICS | Age: 52
End: 2022-10-11

## 2022-10-12 ENCOUNTER — OFFICE VISIT (OUTPATIENT)
Dept: OCCUPATIONAL THERAPY | Age: 52
End: 2022-10-12

## 2022-10-12 DIAGNOSIS — M25.531 RIGHT WRIST PAIN: ICD-10-CM

## 2022-10-12 DIAGNOSIS — M79.641 RIGHT HAND PAIN: ICD-10-CM

## 2022-10-12 PROCEDURE — 97112 NEUROMUSCULAR REEDUCATION: CPT | Performed by: OCCUPATIONAL THERAPIST

## 2022-10-12 PROCEDURE — 97140 MANUAL THERAPY 1/> REGIONS: CPT | Performed by: OCCUPATIONAL THERAPIST

## 2022-10-12 PROCEDURE — 97110 THERAPEUTIC EXERCISES: CPT | Performed by: OCCUPATIONAL THERAPIST

## 2022-10-12 ASSESSMENT — ENCOUNTER SYMPTOMS
QUALITY: ACHE
QUALITY: TIGHT
PAIN SEVERITY NOW: 0
PAIN SCALE AT HIGHEST: 4

## 2022-10-19 ENCOUNTER — APPOINTMENT (OUTPATIENT)
Dept: OCCUPATIONAL THERAPY | Age: 52
End: 2022-10-19

## 2022-10-26 ENCOUNTER — OFFICE VISIT (OUTPATIENT)
Dept: OCCUPATIONAL THERAPY | Age: 52
End: 2022-10-26

## 2022-10-26 DIAGNOSIS — M25.531 RIGHT WRIST PAIN: ICD-10-CM

## 2022-10-26 DIAGNOSIS — M79.641 RIGHT HAND PAIN: ICD-10-CM

## 2022-10-26 PROCEDURE — 97110 THERAPEUTIC EXERCISES: CPT | Performed by: OCCUPATIONAL THERAPIST

## 2022-10-26 PROCEDURE — 97112 NEUROMUSCULAR REEDUCATION: CPT | Performed by: OCCUPATIONAL THERAPIST

## 2022-10-26 PROCEDURE — 97140 MANUAL THERAPY 1/> REGIONS: CPT | Performed by: OCCUPATIONAL THERAPIST

## 2022-10-26 ASSESSMENT — ENCOUNTER SYMPTOMS
QUALITY: SORE
PAIN LOCATION: RIGHT WRIST
PAIN SEVERITY NOW: 1
PAIN SCALE AT HIGHEST: 3
ALLEVIATING FACTORS: ICE
PAIN FREQUENCY: INTERMITTENT
ALLEVIATING FACTORS: MASSAGE
ALLEVIATING FACTORS: HEAT
QUALITY: DISCOMFORT

## 2022-10-27 ENCOUNTER — OFFICE VISIT (OUTPATIENT)
Dept: DERMATOLOGY | Age: 52
End: 2022-10-27

## 2022-10-27 DIAGNOSIS — Z12.83 SCREENING EXAM FOR SKIN CANCER: ICD-10-CM

## 2022-10-27 DIAGNOSIS — D23.9 BENIGN NEOPLASM OF SKIN, UNSPECIFIED LOCATION: Primary | ICD-10-CM

## 2022-10-27 PROCEDURE — 99202 OFFICE O/P NEW SF 15 MIN: CPT | Performed by: DERMATOLOGY

## 2022-10-31 ENCOUNTER — OFFICE VISIT (OUTPATIENT)
Dept: ORTHOPEDICS | Age: 52
End: 2022-10-31

## 2022-10-31 DIAGNOSIS — M24.131 DEGENERATIVE TEAR OF TRIANGULAR FIBROCARTILAGE COMPLEX (TFCC) OF RIGHT WRIST: Primary | ICD-10-CM

## 2022-10-31 PROCEDURE — 99024 POSTOP FOLLOW-UP VISIT: CPT | Performed by: PHYSICIAN ASSISTANT

## 2022-11-02 ENCOUNTER — OFFICE VISIT (OUTPATIENT)
Dept: OCCUPATIONAL THERAPY | Age: 52
End: 2022-11-02

## 2022-11-02 PROCEDURE — 97140 MANUAL THERAPY 1/> REGIONS: CPT | Performed by: OCCUPATIONAL THERAPIST

## 2022-11-02 PROCEDURE — 97112 NEUROMUSCULAR REEDUCATION: CPT | Performed by: OCCUPATIONAL THERAPIST

## 2022-11-02 PROCEDURE — 97110 THERAPEUTIC EXERCISES: CPT | Performed by: OCCUPATIONAL THERAPIST

## 2022-11-02 ASSESSMENT — ENCOUNTER SYMPTOMS: PAIN: 1

## 2022-11-09 ENCOUNTER — OFFICE VISIT (OUTPATIENT)
Dept: OCCUPATIONAL THERAPY | Age: 52
End: 2022-11-09

## 2022-11-09 DIAGNOSIS — M25.531 RIGHT WRIST PAIN: ICD-10-CM

## 2022-11-09 DIAGNOSIS — M79.641 RIGHT HAND PAIN: ICD-10-CM

## 2022-11-09 PROCEDURE — 97112 NEUROMUSCULAR REEDUCATION: CPT | Performed by: OCCUPATIONAL THERAPIST

## 2022-11-09 PROCEDURE — 97140 MANUAL THERAPY 1/> REGIONS: CPT | Performed by: OCCUPATIONAL THERAPIST

## 2022-11-09 PROCEDURE — 97110 THERAPEUTIC EXERCISES: CPT | Performed by: OCCUPATIONAL THERAPIST

## 2022-11-09 ASSESSMENT — ENCOUNTER SYMPTOMS: PAIN: 1

## 2022-11-15 ENCOUNTER — PATIENT MESSAGE (OUTPATIENT)
Dept: INTERNAL MEDICINE CLINIC | Facility: CLINIC | Age: 52
End: 2022-11-15

## 2022-11-16 ENCOUNTER — OFFICE VISIT (OUTPATIENT)
Dept: OCCUPATIONAL THERAPY | Age: 52
End: 2022-11-16

## 2022-11-16 DIAGNOSIS — M25.531 RIGHT WRIST PAIN: ICD-10-CM

## 2022-11-16 DIAGNOSIS — M79.641 RIGHT HAND PAIN: ICD-10-CM

## 2022-11-16 PROCEDURE — 97110 THERAPEUTIC EXERCISES: CPT | Performed by: OCCUPATIONAL THERAPIST

## 2022-11-16 PROCEDURE — 97140 MANUAL THERAPY 1/> REGIONS: CPT | Performed by: OCCUPATIONAL THERAPIST

## 2022-11-16 PROCEDURE — 97112 NEUROMUSCULAR REEDUCATION: CPT | Performed by: OCCUPATIONAL THERAPIST

## 2022-11-16 ASSESSMENT — ENCOUNTER SYMPTOMS: PAIN: 1

## 2022-11-18 RX ORDER — ESTRADIOL AND NORETHINDRONE ACETATE .5; .1 MG/1; MG/1
1 TABLET ORAL DAILY
Qty: 84 TABLET | Refills: 3 | Status: SHIPPED | OUTPATIENT
Start: 2022-11-18

## 2022-11-18 RX ORDER — SUMATRIPTAN 25 MG/1
25 TABLET, FILM COATED ORAL EVERY 2 HOUR PRN
Qty: 15 TABLET | Refills: 3 | Status: SHIPPED | OUTPATIENT
Start: 2022-11-18

## 2022-11-18 RX ORDER — PROPRANOLOL HYDROCHLORIDE 120 MG/1
120 CAPSULE, EXTENDED RELEASE ORAL DAILY
Qty: 90 CAPSULE | Refills: 1 | Status: SHIPPED | OUTPATIENT
Start: 2022-11-18

## 2022-12-10 ENCOUNTER — LAB ENCOUNTER (OUTPATIENT)
Dept: LAB | Age: 52
End: 2022-12-10
Attending: INTERNAL MEDICINE
Payer: COMMERCIAL

## 2022-12-10 DIAGNOSIS — D53.9 MACROCYTIC ANEMIA: ICD-10-CM

## 2022-12-10 DIAGNOSIS — M79.7 FIBROMYALGIA: ICD-10-CM

## 2022-12-10 DIAGNOSIS — D70.9 NEUTROPENIA, UNSPECIFIED TYPE (HCC): ICD-10-CM

## 2022-12-10 DIAGNOSIS — R79.89 ABNORMAL CBC: ICD-10-CM

## 2022-12-10 DIAGNOSIS — M32.8 OTHER FORMS OF SYSTEMIC LUPUS ERYTHEMATOSUS, UNSPECIFIED ORGAN INVOLVEMENT STATUS (HCC): ICD-10-CM

## 2022-12-10 DIAGNOSIS — Z01.818 PRE-OP EXAM: ICD-10-CM

## 2022-12-10 LAB
ALBUMIN SERPL-MCNC: 3.6 G/DL (ref 3.4–5)
ALP LIVER SERPL-CCNC: 67 U/L
ALT SERPL-CCNC: 34 U/L
AST SERPL-CCNC: 29 U/L (ref 15–37)
BASOPHILS # BLD AUTO: 0.04 X10(3) UL (ref 0–0.2)
BASOPHILS NFR BLD AUTO: 1.3 %
BILIRUB DIRECT SERPL-MCNC: 0.1 MG/DL (ref 0–0.2)
BILIRUB SERPL-MCNC: 0.4 MG/DL (ref 0.1–2)
BUN BLD-MCNC: 15 MG/DL (ref 7–18)
C3 SERPL-MCNC: 133 MG/DL (ref 90–180)
C4 SERPL-MCNC: 33.3 MG/DL (ref 10–40)
CHOLEST SERPL-MCNC: 204 MG/DL (ref ?–200)
CREAT BLD-MCNC: 0.87 MG/DL
CRP SERPL-MCNC: <0.29 MG/DL (ref ?–0.3)
EOSINOPHIL # BLD AUTO: 0.09 X10(3) UL (ref 0–0.7)
EOSINOPHIL NFR BLD AUTO: 2.8 %
ERYTHROCYTE [DISTWIDTH] IN BLOOD BY AUTOMATED COUNT: 13.1 %
ERYTHROCYTE [SEDIMENTATION RATE] IN BLOOD: 12 MM/HR
FASTING PATIENT LIPID ANSWER: YES
FOLATE SERPL-MCNC: 20.8 NG/ML (ref 8.7–?)
GFR SERPLBLD BASED ON 1.73 SQ M-ARVRAT: 80 ML/MIN/1.73M2 (ref 60–?)
HCT VFR BLD AUTO: 33.6 %
HDLC SERPL-MCNC: 63 MG/DL (ref 40–59)
HGB BLD-MCNC: 11 G/DL
IMM GRANULOCYTES # BLD AUTO: 0 X10(3) UL (ref 0–1)
IMM GRANULOCYTES NFR BLD: 0 %
LDLC SERPL CALC-MCNC: 112 MG/DL (ref ?–100)
LYMPHOCYTES # BLD AUTO: 0.88 X10(3) UL (ref 1–4)
LYMPHOCYTES NFR BLD AUTO: 27.8 %
MCH RBC QN AUTO: 34 PG (ref 26–34)
MCHC RBC AUTO-ENTMCNC: 32.7 G/DL (ref 31–37)
MCV RBC AUTO: 103.7 FL
MONOCYTES # BLD AUTO: 0.31 X10(3) UL (ref 0.1–1)
MONOCYTES NFR BLD AUTO: 9.8 %
NEUTROPHILS # BLD AUTO: 1.85 X10 (3) UL (ref 1.5–7.7)
NEUTROPHILS # BLD AUTO: 1.85 X10(3) UL (ref 1.5–7.7)
NEUTROPHILS NFR BLD AUTO: 58.3 %
NONHDLC SERPL-MCNC: 141 MG/DL (ref ?–130)
PLATELET # BLD AUTO: 205 10(3)UL (ref 150–450)
PROT SERPL-MCNC: 6.8 G/DL (ref 6.4–8.2)
RBC # BLD AUTO: 3.24 X10(6)UL
TRIGL SERPL-MCNC: 166 MG/DL (ref 30–149)
TSI SER-ACNC: 1.41 MIU/ML (ref 0.36–3.74)
VIT B12 SERPL-MCNC: 308 PG/ML (ref 193–986)
VLDLC SERPL CALC-MCNC: 29 MG/DL (ref 0–30)
WBC # BLD AUTO: 3.2 X10(3) UL (ref 4–11)

## 2022-12-10 PROCEDURE — 80061 LIPID PANEL: CPT

## 2022-12-10 PROCEDURE — 86160 COMPLEMENT ANTIGEN: CPT

## 2022-12-10 PROCEDURE — 84520 ASSAY OF UREA NITROGEN: CPT

## 2022-12-10 PROCEDURE — 82565 ASSAY OF CREATININE: CPT

## 2022-12-10 PROCEDURE — 85025 COMPLETE CBC W/AUTO DIFF WBC: CPT

## 2022-12-10 PROCEDURE — 82746 ASSAY OF FOLIC ACID SERUM: CPT

## 2022-12-10 PROCEDURE — 86256 FLUORESCENT ANTIBODY TITER: CPT

## 2022-12-10 PROCEDURE — 36415 COLL VENOUS BLD VENIPUNCTURE: CPT

## 2022-12-10 PROCEDURE — 84443 ASSAY THYROID STIM HORMONE: CPT

## 2022-12-10 PROCEDURE — 85652 RBC SED RATE AUTOMATED: CPT

## 2022-12-10 PROCEDURE — 86140 C-REACTIVE PROTEIN: CPT

## 2022-12-10 PROCEDURE — 80076 HEPATIC FUNCTION PANEL: CPT

## 2022-12-10 PROCEDURE — 82607 VITAMIN B-12: CPT

## 2022-12-12 ENCOUNTER — OFFICE VISIT (OUTPATIENT)
Dept: ORTHOPEDICS | Age: 52
End: 2022-12-12

## 2022-12-12 DIAGNOSIS — R22.31 MASS OF RIGHT HAND: ICD-10-CM

## 2022-12-12 DIAGNOSIS — M24.131 DEGENERATIVE TEAR OF TRIANGULAR FIBROCARTILAGE COMPLEX (TFCC) OF RIGHT WRIST: Primary | ICD-10-CM

## 2022-12-12 PROCEDURE — 99024 POSTOP FOLLOW-UP VISIT: CPT | Performed by: PHYSICIAN ASSISTANT

## 2023-01-01 ENCOUNTER — MOBILE ENCOUNTER (OUTPATIENT)
Dept: INTERNAL MEDICINE CLINIC | Facility: CLINIC | Age: 53
End: 2023-01-01

## 2023-01-01 ENCOUNTER — PATIENT MESSAGE (OUTPATIENT)
Dept: INTERNAL MEDICINE CLINIC | Facility: CLINIC | Age: 53
End: 2023-01-01

## 2023-01-01 ENCOUNTER — TELEPHONE (OUTPATIENT)
Dept: INTERNAL MEDICINE CLINIC | Facility: CLINIC | Age: 53
End: 2023-01-01

## 2023-01-01 RX ORDER — ALBUTEROL SULFATE 90 UG/1
2 AEROSOL, METERED RESPIRATORY (INHALATION) EVERY 6 HOURS PRN
Qty: 18 G | Refills: 0 | Status: SHIPPED | OUTPATIENT
Start: 2023-01-01

## 2023-01-01 NOTE — TELEPHONE ENCOUNTER
HPI: symptoms for COVID started during the day yesterday. Home test today was positive. Started with sore throat and fatigue. Now, she is experiencing a severe headache, chest tightness, dry cough. Denies shortness of breath. However, she does have a bit of chest tightness. Denies wheezing. She has mild sinus pressure, ear pain, and Temp is normal. She is experiencing body aches. REVIEW OF SYSTEMS:   GENERAL HEALTH: see HPI   NEURO: see HPI  VISION: denies any blurred or double vision  RESPIRATORY: see HPI  CARDIOVASCULAR: denies palpitations  GI: denies abdominal pain,  diarrhea, n/v, BRBPR, melena. Chronic constipation is stable  : no dysuria or hematuria  EXT: denies edema     Assessment/PLan:  1. COVID infection - discussed risks and benefits of paxlovid therapy and she agrees to start. She tolerated therapy well in April, 2022. I have asked her to hold gabapentin, orphenadrine, and nortriptiline while on paxlovid. She will also hold azathioprine, methotrexate, and HCQ until she speaks with rheumatology.

## 2023-01-03 ENCOUNTER — TELEMEDICINE (OUTPATIENT)
Dept: INTERNAL MEDICINE CLINIC | Facility: CLINIC | Age: 53
End: 2023-01-03
Payer: COMMERCIAL

## 2023-01-03 DIAGNOSIS — R11.0 NAUSEA: Primary | ICD-10-CM

## 2023-01-03 DIAGNOSIS — U07.1 COVID-19 VIRUS INFECTION: ICD-10-CM

## 2023-01-03 DIAGNOSIS — R19.7 DIARRHEA OF PRESUMED INFECTIOUS ORIGIN: ICD-10-CM

## 2023-01-03 PROCEDURE — 99213 OFFICE O/P EST LOW 20 MIN: CPT | Performed by: INTERNAL MEDICINE

## 2023-01-03 RX ORDER — HYDROCODONE BITARTRATE AND ACETAMINOPHEN 5; 325 MG/1; MG/1
1 TABLET ORAL EVERY 6 HOURS PRN
COMMUNITY
Start: 2022-09-16

## 2023-01-03 RX ORDER — ONDANSETRON 4 MG/1
4 TABLET, ORALLY DISINTEGRATING ORAL EVERY 4 HOURS
Qty: 30 TABLET | Refills: 0 | Status: SHIPPED | OUTPATIENT
Start: 2023-01-03

## 2023-01-03 NOTE — TELEPHONE ENCOUNTER
From: Shawn Manuel MD  To: Sheila Elliott  Sent: 1/1/2023 2:36 PM CST  Subject: drug interactions    Dear Adam Tapia,    Please hold gabapentin, orphenadrine, and nortriptiline while on paxlovid. Please discuss azathioprine, methotrexate, and hydroxychloroquine with Dr. Christofer Donaldson.      Sincerelyl  Dr. Shawn Manuel

## 2023-01-09 ENCOUNTER — OFFICE VISIT (OUTPATIENT)
Dept: ORTHOPEDICS | Age: 53
End: 2023-01-09

## 2023-01-09 ENCOUNTER — IMAGING SERVICES (OUTPATIENT)
Dept: GENERAL RADIOLOGY | Age: 53
End: 2023-01-09
Attending: ORTHOPAEDIC SURGERY

## 2023-01-09 VITALS — WEIGHT: 195 LBS | HEIGHT: 68 IN | BODY MASS INDEX: 29.55 KG/M2

## 2023-01-09 DIAGNOSIS — M79.641 RIGHT HAND PAIN: ICD-10-CM

## 2023-01-09 DIAGNOSIS — M25.531 RIGHT WRIST PAIN: ICD-10-CM

## 2023-01-09 DIAGNOSIS — M67.431 GANGLION OF RIGHT WRIST: Primary | ICD-10-CM

## 2023-01-09 PROCEDURE — 73130 X-RAY EXAM OF HAND: CPT | Performed by: RADIOLOGY

## 2023-01-09 PROCEDURE — 99213 OFFICE O/P EST LOW 20 MIN: CPT | Performed by: ORTHOPAEDIC SURGERY

## 2023-02-23 ENCOUNTER — LAB ENCOUNTER (OUTPATIENT)
Dept: LAB | Age: 53
End: 2023-02-23
Attending: INTERNAL MEDICINE
Payer: COMMERCIAL

## 2023-02-23 DIAGNOSIS — R19.7 DIARRHEA OF PRESUMED INFECTIOUS ORIGIN: ICD-10-CM

## 2023-02-23 LAB — WBC # BLD AUTO: 2.6 X10(3) UL (ref 4–11)

## 2023-02-23 PROCEDURE — 85048 AUTOMATED LEUKOCYTE COUNT: CPT

## 2023-02-23 PROCEDURE — 36415 COLL VENOUS BLD VENIPUNCTURE: CPT

## 2023-03-21 ENCOUNTER — PATIENT MESSAGE (OUTPATIENT)
Dept: INTERNAL MEDICINE CLINIC | Facility: CLINIC | Age: 53
End: 2023-03-21

## 2023-03-21 NOTE — TELEPHONE ENCOUNTER
From: Tre Lucas  To: Maia Moncada MD  Sent: 3/21/2023 6:04 AM CDT  Subject: Supplements     Hi Dr Zach Parikh,  I was thinking about starting to take Organic Ashwagandha powder and Royal jelly powder. Do you think this would be a problem with my meds or health condition?     Thanks [employer]

## 2023-03-22 RX ORDER — PROPRANOLOL HYDROCHLORIDE 120 MG/1
CAPSULE, EXTENDED RELEASE ORAL
Qty: 90 CAPSULE | Refills: 3 | Status: SHIPPED | OUTPATIENT
Start: 2023-03-22

## 2023-06-02 ENCOUNTER — OFFICE VISIT (OUTPATIENT)
Dept: INTERNAL MEDICINE CLINIC | Facility: CLINIC | Age: 53
End: 2023-06-02
Payer: COMMERCIAL

## 2023-06-02 VITALS
HEIGHT: 67.72 IN | TEMPERATURE: 98 F | DIASTOLIC BLOOD PRESSURE: 72 MMHG | WEIGHT: 198.63 LBS | BODY MASS INDEX: 30.46 KG/M2 | SYSTOLIC BLOOD PRESSURE: 100 MMHG | OXYGEN SATURATION: 96 % | HEART RATE: 68 BPM | RESPIRATION RATE: 16 BRPM

## 2023-06-02 DIAGNOSIS — G43.709 CHRONIC MIGRAINE WITHOUT AURA WITHOUT STATUS MIGRAINOSUS, NOT INTRACTABLE: ICD-10-CM

## 2023-06-02 DIAGNOSIS — N95.1 MENOPAUSAL SYNDROME ON HORMONE REPLACEMENT THERAPY: ICD-10-CM

## 2023-06-02 DIAGNOSIS — Z00.00 ROUTINE GENERAL MEDICAL EXAMINATION AT A HEALTH CARE FACILITY: Primary | ICD-10-CM

## 2023-06-02 DIAGNOSIS — Z12.4 SCREENING FOR CERVICAL CANCER: ICD-10-CM

## 2023-06-02 DIAGNOSIS — Z79.890 MENOPAUSAL SYNDROME ON HORMONE REPLACEMENT THERAPY: ICD-10-CM

## 2023-06-02 DIAGNOSIS — M32.8 OTHER FORMS OF SYSTEMIC LUPUS ERYTHEMATOSUS, UNSPECIFIED ORGAN INVOLVEMENT STATUS (HCC): ICD-10-CM

## 2023-06-02 DIAGNOSIS — M79.7 FIBROMYALGIA: ICD-10-CM

## 2023-06-02 PROBLEM — D12.2 BENIGN NEOPLASM OF ASCENDING COLON: Status: RESOLVED | Noted: 2020-09-15 | Resolved: 2023-06-02

## 2023-06-02 PROCEDURE — 99396 PREV VISIT EST AGE 40-64: CPT | Performed by: INTERNAL MEDICINE

## 2023-06-02 PROCEDURE — 3078F DIAST BP <80 MM HG: CPT | Performed by: INTERNAL MEDICINE

## 2023-06-02 PROCEDURE — 99214 OFFICE O/P EST MOD 30 MIN: CPT | Performed by: INTERNAL MEDICINE

## 2023-06-02 PROCEDURE — 3074F SYST BP LT 130 MM HG: CPT | Performed by: INTERNAL MEDICINE

## 2023-06-02 PROCEDURE — 3008F BODY MASS INDEX DOCD: CPT | Performed by: INTERNAL MEDICINE

## 2023-06-02 RX ORDER — GALCANEZUMAB 120 MG/ML
120 INJECTION, SOLUTION SUBCUTANEOUS
Qty: 3 ML | Refills: 3 | Status: SHIPPED | OUTPATIENT
Start: 2023-06-02 | End: 2023-06-30

## 2023-06-02 RX ORDER — NORTRIPTYLINE HYDROCHLORIDE 10 MG/1
10 CAPSULE ORAL NIGHTLY
Qty: 30 CAPSULE | Refills: 0 | Status: SHIPPED | OUTPATIENT
Start: 2023-06-02

## 2023-06-05 ENCOUNTER — TELEPHONE (OUTPATIENT)
Dept: INTERNAL MEDICINE CLINIC | Facility: CLINIC | Age: 53
End: 2023-06-05

## 2023-06-14 NOTE — TELEPHONE ENCOUNTER
Incoming fax from [employer] to start a PA for the medication Emagality 120 mg auto injector. Placed in [address] in basket for review.

## 2023-06-16 NOTE — TELEPHONE ENCOUNTER
Out going fax of completed forms to [facility]     Received confirmation report, made a copy one was sent to scan and the other was placed in [employer].

## 2023-06-19 NOTE — TELEPHONE ENCOUNTER
Incoming fax from [facility] [place] of notice of approval of Emgality inj 120mg. Approved through 12/16/2023 1 per month     Placed in Dr Magy Menard in basket for her to review.

## 2023-06-26 ENCOUNTER — OFFICE VISIT (OUTPATIENT)
Dept: INTERNAL MEDICINE CLINIC | Facility: CLINIC | Age: 53
End: 2023-06-26
Payer: COMMERCIAL

## 2023-06-26 ENCOUNTER — TELEPHONE (OUTPATIENT)
Dept: INTERNAL MEDICINE CLINIC | Facility: CLINIC | Age: 53
End: 2023-06-26

## 2023-06-26 VITALS
SYSTOLIC BLOOD PRESSURE: 110 MMHG | OXYGEN SATURATION: 99 % | TEMPERATURE: 98 F | HEIGHT: 68 IN | WEIGHT: 202.81 LBS | RESPIRATION RATE: 16 BRPM | BODY MASS INDEX: 30.74 KG/M2 | HEART RATE: 68 BPM | DIASTOLIC BLOOD PRESSURE: 68 MMHG

## 2023-06-26 DIAGNOSIS — N30.00 ACUTE CYSTITIS WITHOUT HEMATURIA: Primary | ICD-10-CM

## 2023-06-26 DIAGNOSIS — B37.31 VAGINAL CANDIDA: ICD-10-CM

## 2023-06-26 LAB
BILIRUBIN: NEGATIVE
GLUCOSE (URINE DIPSTICK): NEGATIVE MG/DL
KETONES (URINE DIPSTICK): NEGATIVE MG/DL
MULTISTIX LOT#: ABNORMAL NUMERIC
NITRITE, URINE: NEGATIVE
PH, URINE: 6 (ref 4.5–8)
SPECIFIC GRAVITY: >=1.03 (ref 1–1.03)
URINE-COLOR: YELLOW
UROBILINOGEN,SEMI-QN: 0.2 MG/DL (ref 0–1.9)

## 2023-06-26 PROCEDURE — 87086 URINE CULTURE/COLONY COUNT: CPT | Performed by: INTERNAL MEDICINE

## 2023-06-26 RX ORDER — FLUCONAZOLE 150 MG/1
TABLET ORAL
Qty: 3 TABLET | Refills: 0 | Status: SHIPPED | OUTPATIENT
Start: 2023-06-26

## 2023-06-26 RX ORDER — NITROFURANTOIN 25; 75 MG/1; MG/1
100 CAPSULE ORAL 2 TIMES DAILY
Qty: 10 CAPSULE | Refills: 0 | Status: SHIPPED | OUTPATIENT
Start: 2023-06-26 | End: 2023-07-01

## 2023-06-26 NOTE — PATIENT INSTRUCTIONS
I have sent antibiotics (macrobid) and a diflucan to treat yeast infection    I have also sent information through [employer] to take a look at what things to do/avoid to prevent UTI

## 2023-07-14 ENCOUNTER — PATIENT MESSAGE (OUTPATIENT)
Dept: INTERNAL MEDICINE CLINIC | Facility: CLINIC | Age: 53
End: 2023-07-14

## 2023-07-14 NOTE — TELEPHONE ENCOUNTER
See [facility] from patient. Nortriptyline 10 mg discontinued from med list per patient. Patient requesting increase of dose for current medication. Rx pended for sumatriptan to [facility]. LOV:   06/02/2023  # Migraines: not well controlled she is experiencing symptoms 6 days per week on propranolol. We will try to get emgality covered   - were very well controlled on emgality but insurance will no longer cover it. - she had daily migraines prior to starting emgality. With emgality, migraines improved to 1 time per month or less. - topamax caused worsening memory, botox caused an allergic reaction. Nortriptyline 10 and 25 mg (not effective and could not tolerate higher dosing). Gabapentin 3600 mg (not effective), baclofen 10 mg TID and orphenadrine 100 mg BID did not help.

## 2023-07-19 RX ORDER — SUMATRIPTAN 25 MG/1
25 TABLET, FILM COATED ORAL EVERY 2 HOUR PRN
Qty: 15 TABLET | Refills: 3 | Status: SHIPPED | OUTPATIENT
Start: 2023-07-19

## 2023-07-31 ENCOUNTER — OFFICE VISIT (OUTPATIENT)
Dept: INTERNAL MEDICINE CLINIC | Facility: CLINIC | Age: 53
End: 2023-07-31
Payer: COMMERCIAL

## 2023-07-31 VITALS
BODY MASS INDEX: 31 KG/M2 | TEMPERATURE: 98 F | RESPIRATION RATE: 12 BRPM | WEIGHT: 202.81 LBS | HEART RATE: 60 BPM | OXYGEN SATURATION: 99 % | SYSTOLIC BLOOD PRESSURE: 124 MMHG | DIASTOLIC BLOOD PRESSURE: 76 MMHG

## 2023-07-31 DIAGNOSIS — R10.10 UPPER ABDOMINAL PAIN: Primary | ICD-10-CM

## 2023-07-31 PROCEDURE — 3074F SYST BP LT 130 MM HG: CPT | Performed by: INTERNAL MEDICINE

## 2023-07-31 PROCEDURE — 3078F DIAST BP <80 MM HG: CPT | Performed by: INTERNAL MEDICINE

## 2023-07-31 PROCEDURE — 99213 OFFICE O/P EST LOW 20 MIN: CPT | Performed by: INTERNAL MEDICINE

## 2023-07-31 NOTE — PATIENT INSTRUCTIONS
- Schedule CT scan. You can schedule through [employer] or call central scheduling at [phone] to schedule  - We will decide next steps based on CT scan results  - Let us know if you develop fevers, chills, sweats, diarrhea, worsening abdominal pain. It was a pleasure seeing you in the clinic today. Thank you for choosing the [facility] office for your healthcare needs. Please call at [phone] with any questions or concerns.     Rene Hollis MD

## 2023-08-11 DIAGNOSIS — Z90.710 HISTORY OF HYSTERECTOMY: ICD-10-CM

## 2023-08-11 DIAGNOSIS — R93.5 ABNORMAL CT SCAN, PELVIS: Primary | ICD-10-CM

## 2023-08-19 RX ORDER — ESTRADIOL AND NORETHINDRONE ACETATE .5; .1 MG/1; MG/1
1 TABLET ORAL DAILY
Qty: 84 TABLET | Refills: 3 | Status: SHIPPED | OUTPATIENT
Start: 2023-08-19

## 2023-08-29 ENCOUNTER — OFFICE VISIT (OUTPATIENT)
Dept: INTERNAL MEDICINE CLINIC | Facility: CLINIC | Age: 53
End: 2023-08-29
Payer: COMMERCIAL

## 2023-08-29 VITALS
DIASTOLIC BLOOD PRESSURE: 80 MMHG | WEIGHT: 204.81 LBS | SYSTOLIC BLOOD PRESSURE: 128 MMHG | BODY MASS INDEX: 31.04 KG/M2 | TEMPERATURE: 98 F | HEIGHT: 68 IN | HEART RATE: 56 BPM | RESPIRATION RATE: 14 BRPM | OXYGEN SATURATION: 98 %

## 2023-08-29 DIAGNOSIS — M35.01 SJOGREN'S SYNDROME WITH KERATOCONJUNCTIVITIS SICCA (HCC): ICD-10-CM

## 2023-08-29 DIAGNOSIS — Z01.818 PREOPERATIVE EXAMINATION: Primary | ICD-10-CM

## 2023-08-29 DIAGNOSIS — M32.8 OTHER FORMS OF SYSTEMIC LUPUS ERYTHEMATOSUS, UNSPECIFIED ORGAN INVOLVEMENT STATUS (HCC): ICD-10-CM

## 2023-08-29 DIAGNOSIS — E89.0 POSTPROCEDURAL HYPOTHYROIDISM: ICD-10-CM

## 2023-08-29 DIAGNOSIS — N20.0 LEFT NEPHROLITHIASIS: ICD-10-CM

## 2023-08-29 DIAGNOSIS — R10.30 LOWER ABDOMINAL PAIN: ICD-10-CM

## 2023-08-29 DIAGNOSIS — M79.7 FIBROMYALGIA: ICD-10-CM

## 2023-08-29 PROCEDURE — 99214 OFFICE O/P EST MOD 30 MIN: CPT | Performed by: INTERNAL MEDICINE

## 2023-08-29 PROCEDURE — 3079F DIAST BP 80-89 MM HG: CPT | Performed by: INTERNAL MEDICINE

## 2023-08-29 PROCEDURE — 3008F BODY MASS INDEX DOCD: CPT | Performed by: INTERNAL MEDICINE

## 2023-08-29 PROCEDURE — 3074F SYST BP LT 130 MM HG: CPT | Performed by: INTERNAL MEDICINE

## 2023-08-30 ENCOUNTER — TELEPHONE (OUTPATIENT)
Dept: INTERNAL MEDICINE CLINIC | Facility: CLINIC | Age: 53
End: 2023-08-30

## 2023-09-04 ENCOUNTER — HOSPITAL ENCOUNTER (OUTPATIENT)
Age: 53
Discharge: HOME | End: 2023-09-04
Payer: COMMERCIAL

## 2023-09-04 VITALS
TEMPERATURE: 98 F | RESPIRATION RATE: 18 BRPM | DIASTOLIC BLOOD PRESSURE: 83 MMHG | OXYGEN SATURATION: 100 % | BODY MASS INDEX: 30.31 KG/M2 | SYSTOLIC BLOOD PRESSURE: 137 MMHG | HEIGHT: 68 IN | WEIGHT: 200 LBS | HEART RATE: 61 BPM

## 2023-09-04 DIAGNOSIS — M32.9 EXACERBATION OF SYSTEMIC LUPUS (HCC): Primary | ICD-10-CM

## 2023-09-04 PROCEDURE — 99213 OFFICE O/P EST LOW 20 MIN: CPT | Performed by: NURSE PRACTITIONER

## 2023-09-04 RX ORDER — PREDNISONE 10 MG/1
TABLET ORAL
Qty: 39 TABLET | Refills: 0 | Status: SHIPPED | OUTPATIENT
Start: 2023-09-04 | End: 2023-09-16

## 2023-10-09 ENCOUNTER — LAB ENCOUNTER (OUTPATIENT)
Dept: LAB | Age: 53
End: 2023-10-09
Attending: INTERNAL MEDICINE
Payer: COMMERCIAL

## 2023-10-09 ENCOUNTER — OFFICE VISIT (OUTPATIENT)
Dept: INTERNAL MEDICINE CLINIC | Facility: CLINIC | Age: 53
End: 2023-10-09
Payer: COMMERCIAL

## 2023-10-09 VITALS
HEIGHT: 68 IN | BODY MASS INDEX: 31.52 KG/M2 | TEMPERATURE: 98 F | HEART RATE: 59 BPM | OXYGEN SATURATION: 98 % | SYSTOLIC BLOOD PRESSURE: 140 MMHG | WEIGHT: 208 LBS | RESPIRATION RATE: 14 BRPM | DIASTOLIC BLOOD PRESSURE: 80 MMHG

## 2023-10-09 DIAGNOSIS — Z23 NEED FOR IMMUNIZATION AGAINST INFLUENZA: ICD-10-CM

## 2023-10-09 DIAGNOSIS — M79.671 ACUTE PAIN OF RIGHT FOOT: ICD-10-CM

## 2023-10-09 DIAGNOSIS — M79.671 ACUTE PAIN OF RIGHT FOOT: Primary | ICD-10-CM

## 2023-10-09 LAB — URATE SERPL-MCNC: 4.3 MG/DL

## 2023-10-09 PROCEDURE — 36415 COLL VENOUS BLD VENIPUNCTURE: CPT

## 2023-10-09 PROCEDURE — 84550 ASSAY OF BLOOD/URIC ACID: CPT

## 2023-10-09 NOTE — PATIENT INSTRUCTIONS
- Get uric acid (gout) blood test done today  - If this blood test is high we will start a prescription anti-inflammatory, indomethacin.  - If the blood test is normal we will try a different anti-inflammatory, meloxicam, and likely get an x-ray of your foot as well. - Flu shot given today    It was a pleasure seeing you in the clinic today. Thank you for choosing the [facility] office for your healthcare needs. Please call at [phone] with any questions or concerns.     Ade Childs MD

## 2023-10-10 DIAGNOSIS — M79.671 ACUTE PAIN OF RIGHT FOOT: Primary | ICD-10-CM

## 2023-10-10 RX ORDER — MELOXICAM 7.5 MG/1
7.5 TABLET ORAL DAILY PRN
Qty: 30 TABLET | Refills: 0 | Status: SHIPPED | OUTPATIENT
Start: 2023-10-10

## 2023-10-16 ENCOUNTER — PATIENT MESSAGE (OUTPATIENT)
Dept: INTERNAL MEDICINE CLINIC | Facility: CLINIC | Age: 53
End: 2023-10-16

## 2023-10-16 DIAGNOSIS — M79.671 ACUTE PAIN OF RIGHT FOOT: Primary | ICD-10-CM

## 2023-10-17 NOTE — TELEPHONE ENCOUNTER
See [facility] from patient, xray? LOV:   10/09/2023 Dr Linette Skaggs  1. Acute pain of right foot  Patient with pain in right medial foot for two weeks. No injury to the area. Tender to palpation right medial foot/right great toe. Possible bunion. Will check uric acid levels - if high will start on indomethacin; if normal will start on meloxicam and check x-ray of foot. - Uric Acid; Future        Hello,  Your uric acid levels are normal.  Less likely your foot pain is from gout. I will send in a prescription anti-inflammatory, meloxicam.  Take 1 tablet daily. Let us know if pain is not better within a week - may check foot x-ray at that point. Let me know if you have questions about these results.   Take care,  Dr. Linette Skaggs   Written by Hunter Jenkins MD on 10/10/2023  8:51 AM CDT  Seen by patient Gabriela Munguia on 10/16/2023  1:29 PM

## 2023-10-17 NOTE — TELEPHONE ENCOUNTER
From: Katia Varela  To: [facility] [employer]  Sent: 10/16/2023 1:31 PM CDT  Subject: Foot pain    Still having pain in my right foot. What is our next step?     Pasqual Epley

## 2023-10-20 ENCOUNTER — HOSPITAL ENCOUNTER (OUTPATIENT)
Dept: GENERAL RADIOLOGY | Age: 53
Discharge: HOME | End: 2023-10-20
Attending: INTERNAL MEDICINE
Payer: COMMERCIAL

## 2023-10-20 DIAGNOSIS — M79.671 ACUTE PAIN OF RIGHT FOOT: ICD-10-CM

## 2023-10-20 PROCEDURE — 73630 X-RAY EXAM OF FOOT: CPT | Performed by: INTERNAL MEDICINE

## 2023-11-28 ENCOUNTER — OFFICE VISIT (OUTPATIENT)
Facility: LOCATION | Age: 53
End: 2023-11-28
Payer: COMMERCIAL

## 2023-11-28 ENCOUNTER — HOSPITAL ENCOUNTER (OUTPATIENT)
Dept: GENERAL RADIOLOGY | Age: 53
Discharge: HOME | End: 2023-11-28
Attending: PODIATRIST
Payer: COMMERCIAL

## 2023-11-28 DIAGNOSIS — M20.5X1 HALLUX LIMITUS, RIGHT: ICD-10-CM

## 2023-11-28 DIAGNOSIS — M79.671 RIGHT FOOT PAIN: ICD-10-CM

## 2023-11-28 DIAGNOSIS — M77.51 BURSITIS OF RIGHT FOOT: Primary | ICD-10-CM

## 2023-11-28 DIAGNOSIS — R60.0 EDEMA OF RIGHT FOOT: ICD-10-CM

## 2023-11-28 PROCEDURE — 99203 OFFICE O/P NEW LOW 30 MIN: CPT | Performed by: PODIATRIST

## 2023-11-28 PROCEDURE — 73630 X-RAY EXAM OF FOOT: CPT | Performed by: PODIATRIST

## 2023-11-28 PROCEDURE — 20600 DRAIN/INJ JOINT/BURSA W/O US: CPT | Performed by: PODIATRIST

## 2023-11-28 RX ORDER — TRIAMCINOLONE ACETONIDE 40 MG/ML
40 INJECTION, SUSPENSION INTRA-ARTICULAR; INTRAMUSCULAR ONCE
Status: COMPLETED | OUTPATIENT
Start: 2023-11-28 | End: 2023-11-28

## 2023-11-28 NOTE — PROCEDURES
Per Dr Michael Hood, draw up 1ml Marcaine, 1ml [id number] and 1ml Lidocaine 1%for right hallux injection.

## 2023-11-28 NOTE — PROGRESS NOTES
[facility]  Progress Note    Cindy Norwood is a 47year old female. Chief Complaint   Patient presents with    New Patient     Right foot pain, in the hallux joint area. She rates pain 3/10 in the office, weight bearing 7/10. She has a burning sensation on top of right foot and medial side of joint area. Xrays taken on 10/20/23 non weight bearing. HPI:     Patient is a pleasant 59-year-old female who is presenting to clinic today with complaints of right great toe joint pain. Patient states that she has been dealing with pain for the last month around her right great toe joint. She states that the pain is on the inside portion of the joint, as well as pain being elicited with ambulation. She does state that motion of the joint does elicit pain as well. She denies any injuries. She is utilizing supportive [employer] tennis shoes with new custom orthotics that she got 6 months ago. She does notice some associated swelling to the area. Denies history of gout. Patient also has lupus and fibromyalgia. Denying any other concerns and is here for further evaluation and care. Denies recent nausea, vomiting, fever, chills. Allergies: Benlysta [belimumab]; Botox [botulinum toxin type a, cosm]; Penicillins; Adhesive tape; and Ultram [tramadol]   Current Outpatient Medications   Medication Sig Dispense Refill    Meloxicam 7.5 MG Oral Tab Take 1 tablet (7.5 mg total) by mouth daily as needed for Pain. 30 tablet 0    methotrexate 2.5 MG Oral Tab TAKE 5 TABLETS BY MOUTH EVERY WEEK ON WEDNESDAY      Estradiol-Norethindrone Acet 0.5-0.1 MG Oral Tab Take 1 tablet by mouth daily. 84 tablet 3    SUMAtriptan 25 MG Oral Tab Take 1 tablet (25 mg total) by mouth every 2 (two) hours as needed for Migraine.  MAX 200MG/24 HRS 15 tablet 3    PROPRANOLOL HCL  MG Oral Capsule SR 24 Hr TAKE 1 CAPSULE BY MOUTH DAILY  REPLACES 80MG DOSE 90 capsule 3    levothyroxine 88 MCG Oral Tab Take 1 tablet (88 mcg total) by mouth daily. 90 tablet 0    Sodium Fluoride 1.1 % Dental Gel USE ONCE DAILY IN PLACE OF YOUR REGULAR TOOTHPASTE      folic acid 1 MG Oral Tab 3 tabs per day (Patient taking differently: 3 tablets (3 mg total). 3 tabs per day) 270 tablet 3    HYDROXYCHLOROQUINE SULFATE 200 MG Oral Tab TAKE 1 TABLET EVERY 12 HOURS. TAKE WITH FOOD 180 tablet 3    hydrocortisone 2.5 % External Cream Apply 1 Application topically 2 (two) times daily. Apply every morning and evening. 28 g 1    Cholecalciferol (VITAMIN D) 50 MCG (2000 UT) Oral Cap Take by mouth daily. Multiple Vitamins-Minerals (MULTIVITAMIN ADULT OR) Take by mouth daily.         Past Medical History:   Diagnosis Date    Abdominal pain August 15    Noticed a few weeks ago    Anemia     Back pain     Constipation Unsure    I don't go every day    Decorative tattoo     Several    Easy bruising     Fatigue Unsure    Been happening for awhile    Fibromyalgia     Fibromyalgia     Headache disorder Unsure    Being treated for migraines    Lupus ([address]     Lupus ([address]     Pain in joints     Shortness of breath     Sometimes not a regular thing    Sjogren's syndrome ([address]     Sjogrens syndrome ([address]     Skin blushing/flushing     I get hot flashes    Sleep apnea     Been awhile    Thyroid disease     Wears glasses       Past Surgical History:   Procedure Laterality Date    ANESTH, SECTION      BILAT SALPINGO-OOPHORECT W/ OMENTECT, TOTAL ABDOM HYSTE  2014    [facility]      1993, , and 2005    MASTECTOMY LEFT  2015    MASTECTOMY MODIFIED RADICAL Bilateral 2015    MASTECTOMY RIGHT  2015    OTHER      partia hysterectomy    THYROIDECTOMY      THYROIDECTOMY  2012    TONSILLECTOMY      TOTAL ABDOM HYSTERECTOMY      Partial, tube and ovaries removed      Family History   Problem Relation Age of Onset    Cancer Father         carcinoid tumor    Other Father         Carcinoid - abdominal area    Cancer Mother breast cancer    Breast Cancer Mother     Colon Polyps Mother     Cancer Maternal Grandmother         stomach cacner    Breast Cancer Maternal Aunt     Breast Cancer Maternal Aunt     Other Maternal Aunt         her daughter had ovarian cancer - June    Colon Polyps Brother       Social History     Socioeconomic History    Marital status:    Tobacco Use    Smoking status: Never    Smokeless tobacco: Never   Vaping Use    Vaping Use: Never used   Substance and Sexual Activity    Alcohol use: Never    Drug use: Yes     Types: Cannabis     Comment: marijuana gummies 1-2 times per week   Other Topics Concern    Caffeine Concern Yes     Comment: Maybe 8 oz couple days per week    Exercise No           REVIEW OF SYSTEMS:     10 point ROS completed and was negative, except for pertinent positive and negatives stated in subjective. EXAM:     GENERAL: well developed, well nourished, in no apparent distress  EXTREMITIES:  1. Integument: Skin appears moist, warm, and supple with positive hair growth. There are no color changes. No open lesions. No macerations. No Hyperkeratotic lesions. 2. Vascular: Dorsalis pedis 2/4 bilateral and posterior tibial pulses 2/4 bilateral, capillary refill normal. Mild localized edema to area of 1st MPJ, right. 3. Neurological: Gross sensation intact via light touch bilaterally. Normal sharp/dull sensation  4. Musculoskeletal: Pain at end range of motion of right first MPJ. Pain with palpation to medial prominence of right first MPJ. All muscle groups are graded 5/5 in the foot and ankle. ASSESSMENT AND PLAN:   Diagnoses and all orders for this visit:    Bursitis of right foot    Hallux limitus, right    Right foot pain  -     XR FOOT WEIGHTBEARING (3 VIEWS), RIGHT   (CPT=73630);  Future    Edema of right foot        Plan:   -Patient was seen and evaluated today in clinic.    -Obtained radiographs of the right foot and independently reviewed them today, revealing a mild bunion with slight dorsal spurring to the dorsal aspect of the first metatarsal head. Overall joint space is preserved with soft tissue edema noted to the medial aspect of the first MPJ.    -Explained both clinical and radiographical findings with patient today which is consistent with bursitis the medial aspect of the first MPJ with associated hallux limitus with pain at end range of motion.    -Discussed the etiology of this condition as well as both conservative and surgical treatment options.  -Discussed conservative measures to include wide shoes, extra depth shoes, padding, offloading, orthotics, anti-inflammatory medication, and/or steroid injections.  -Discussed that surgical intervention may be warranted in the future if symptoms do not resolve. -PROCEDURE: 20600-  right first MPJ   After reviewing options for cortisone injection and risks/potential complications, pt has agreed to proceed. Using aseptic technique, injection was then given using 1cc of Kenalog 40, 1 cc of 1% lidocaine plain with 1 cc of 0.5% marcaine plain using a 25 gauge 1.5\" needle. Site of infiltration -right first metatarsal phalangeal joint  Site covered with sterile bandaid  Pt tolerated procedure well and no complications encountered    -Discussed side effects of injection with patient today and recommend icing with OTC anti-inflammatories as needed for any pain the next couple of days.    -The patient indicates understanding of these issues and agrees to the plan. Time spent reviewing pertinent information from patient's chart, reviewing any pertinent imaging, obtaining history and physical exam, and discussing and mutually agreeing on a treatment plan: 30 minutes    RTC 3-4 weeks      ULISSES Rudd [facility]        11/28/2023    Dragon speech recognition software was used to prepare this note. Errors in word recognition may occur. Please contact me with any questions/concerns with this note.

## 2023-12-20 ENCOUNTER — OFFICE VISIT (OUTPATIENT)
Facility: LOCATION | Age: 53
End: 2023-12-20
Payer: COMMERCIAL

## 2023-12-20 DIAGNOSIS — M20.11 HALLUX VALGUS, RIGHT: ICD-10-CM

## 2023-12-20 DIAGNOSIS — M20.5X1 HALLUX LIMITUS, RIGHT: ICD-10-CM

## 2023-12-20 DIAGNOSIS — M77.51 BURSITIS OF RIGHT FOOT: Primary | ICD-10-CM

## 2023-12-20 DIAGNOSIS — M79.671 RIGHT FOOT PAIN: ICD-10-CM

## 2023-12-20 PROCEDURE — 99213 OFFICE O/P EST LOW 20 MIN: CPT | Performed by: PODIATRIST

## 2023-12-20 RX ORDER — MELOXICAM 7.5 MG/1
7.5 TABLET ORAL DAILY
Qty: 30 TABLET | Refills: 1 | Status: SHIPPED | OUTPATIENT
Start: 2023-12-20 | End: 2024-02-18

## 2023-12-20 NOTE — PROGRESS NOTES
[facility]  Progress Note    Claribel Seth is a 46year old female. Chief Complaint   Patient presents with    Toe Pain     Patient is here to follow up from Right hallux cortisone injection given at [address] [place] [address] 11/28/23. She has noticed 80% improvement, just does feel a little numbness, denies any tingling, when pain does happen 4/10. Today in the clinic 3/10. HPI:     Patient is a pleasant 50-year-old female is returning to clinic today for recheck of right great toe joint. Patient did receive a steroid injection at her last visit and states that she has noticed improvements. She states that she is about 80% better, rating the pain today 3/10. She did just buy new [employer] and is currently wearing them. Patient does have a set of custom orthotics but has not put them in her new shoes yet. Most of the discomfort that she has is with ambulation in the great toe joint. No new concerns today and is here for further evaluation and care. Denies recent nausea, vomiting, fever, chills. Allergies: Benlysta [belimumab]; Botox [botulinum toxin type a, cosm]; Penicillins; Adhesive tape; and Ultram [tramadol]   Current Outpatient Medications   Medication Sig Dispense Refill    Meloxicam 7.5 MG Oral Tab Take 1 tablet (7.5 mg total) by mouth daily as needed for Pain. 30 tablet 0    methotrexate 2.5 MG Oral Tab TAKE 5 TABLETS BY MOUTH EVERY WEEK ON WEDNESDAY      Estradiol-Norethindrone Acet 0.5-0.1 MG Oral Tab Take 1 tablet by mouth daily. 84 tablet 3    SUMAtriptan 25 MG Oral Tab Take 1 tablet (25 mg total) by mouth every 2 (two) hours as needed for Migraine. MAX 200MG/24 HRS 15 tablet 3    PROPRANOLOL HCL  MG Oral Capsule SR 24 Hr TAKE 1 CAPSULE BY MOUTH DAILY  REPLACES 80MG DOSE 90 capsule 3    levothyroxine 88 MCG Oral Tab Take 1 tablet (88 mcg total) by mouth daily.  90 tablet 0    Sodium Fluoride 1.1 % Dental Gel USE ONCE DAILY IN PLACE OF YOUR REGULAR TOOTHPASTE      folic acid 1 MG Oral Tab 3 tabs per day (Patient taking differently: 3 tablets (3 mg total). 3 tabs per day) 270 tablet 3    HYDROXYCHLOROQUINE SULFATE 200 MG Oral Tab TAKE 1 TABLET EVERY 12 HOURS. TAKE WITH FOOD 180 tablet 3    hydrocortisone 2.5 % External Cream Apply 1 Application topically 2 (two) times daily. Apply every morning and evening. 28 g 1    Cholecalciferol (VITAMIN D) 50 MCG ( UT) Oral Cap Take by mouth daily. Multiple Vitamins-Minerals (MULTIVITAMIN ADULT OR) Take by mouth daily.         Past Medical History:   Diagnosis Date    Abdominal pain August 15    Noticed a few weeks ago    Anemia     Back pain     Constipation Unsure    I don't go every day    Decorative tattoo     Several    Easy bruising     Fatigue Unsure    Been happening for awhile    Fibromyalgia     Fibromyalgia     Headache disorder Unsure    Being treated for migraines    Lupus ([address]     Lupus ([address]     Pain in joints     Shortness of breath     Sometimes not a regular thing    Sjogren's syndrome ([address]     Sjogrens syndrome ([address]     Skin blushing/flushing     I get hot flashes    Sleep apnea     Been awhile    Thyroid disease     Wears glasses       Past Surgical History:   Procedure Laterality Date    ANESTH, SECTION      BILAT SALPINGO-OOPHORECT W/ OMENTECT, TOTAL ABDOM HYSTE  2014    [facility]      , , and     MASTECTOMY LEFT  2015    MASTECTOMY MODIFIED RADICAL Bilateral 2015    MASTECTOMY RIGHT  2015    OTHER      partia hysterectomy    THYROIDECTOMY      THYROIDECTOMY  2012    TONSILLECTOMY      TOTAL ABDOM HYSTERECTOMY      Partial, tube and ovaries removed      Family History   Problem Relation Age of Onset    Cancer Father         carcinoid tumor    Other Father         Carcinoid - abdominal area    Cancer Mother         breast cancer    Breast Cancer Mother     Colon Polyps Mother     Cancer Maternal Grandmother         stomach cacner    Breast Cancer Maternal Aunt     Breast Cancer Maternal Aunt     Other Maternal Aunt         her daughter had ovarian cancer - June    Colon Polyps Brother       Social History     Socioeconomic History    Marital status:    Tobacco Use    Smoking status: Never    Smokeless tobacco: Never   Vaping Use    Vaping Use: Never used   Substance and Sexual Activity    Alcohol use: Never    Drug use: Yes     Types: Cannabis     Comment: marijuana gummies 1-2 times per week   Other Topics Concern    Caffeine Concern Yes     Comment: Maybe 8 oz couple days per week    Exercise No           REVIEW OF SYSTEMS:     10 point ROS completed and was negative, except for pertinent positive and negatives stated in subjective. EXAM:     GENERAL: well developed, well nourished, in no apparent distress  EXTREMITIES:  1. Integument: Skin appears moist, warm, and supple with positive hair growth. There are no color changes. No open lesions. No macerations. No Hyperkeratotic lesions. 2. Vascular: Dorsalis pedis 2/4 bilateral and posterior tibial pulses 2/4 bilateral, capillary refill normal.  No localized edema to area of 1st MPJ, right. 3. Neurological: Gross sensation intact via light touch bilaterally. Normal sharp/dull sensation  4. Musculoskeletal: No pain at end range of motion of right first MPJ. No pain with palpation to medial prominence of right first MPJ. All muscle groups are graded 5/5 in the foot and ankle. ASSESSMENT AND PLAN:   Diagnoses and all orders for this visit:    Bursitis of right foot    Hallux limitus, right    Right foot pain    Hallux valgus, right        Plan:   -Patient was seen and evaluated today in clinic.    -Discussed clinical exam findings with patient, which shows no pain on exam.  She states she is about 80% better.    -Discussed further options. At this point, I would recommend patient continue utilizing supportive shoe gear and utilizing the custom orthotics that she has.   I advised that I would like to stretch out any further injections to about 3-4 months.    -Patient's previous radiographs do show a mild bunion with a small dorsal spur and soft tissue edema to the medial first MPJ    -Patient would like to try meloxicam for her flareups. -Rx: Meloxicam 7.5 mg 1 p.o. daily as needed for pain and inflammation    -Briefly discussed that if patient's pain worsens or becomes debilitating, we will discuss surgical options at that point.    -The patient indicates understanding of these issues and agrees to the plan. Time spent reviewing pertinent information from patient's chart, reviewing any pertinent imaging, obtaining history and physical exam, and discussing and mutually agreeing on a treatment plan: 20 minutes    RTC as needed      ULISSES Hilario [facility]        12/20/2023    Dragon speech recognition software was used to prepare this note. Errors in word recognition may occur. Please contact me with any questions/concerns with this note.

## 2024-01-08 ENCOUNTER — OFFICE VISIT (OUTPATIENT)
Dept: INTERNAL MEDICINE CLINIC | Facility: CLINIC | Age: 54
End: 2024-01-08
Payer: COMMERCIAL

## 2024-01-08 VITALS
TEMPERATURE: 97 F | DIASTOLIC BLOOD PRESSURE: 80 MMHG | WEIGHT: 202.63 LBS | HEART RATE: 67 BPM | OXYGEN SATURATION: 99 % | RESPIRATION RATE: 16 BRPM | BODY MASS INDEX: 31.43 KG/M2 | SYSTOLIC BLOOD PRESSURE: 102 MMHG | HEIGHT: 67.5 IN

## 2024-01-08 DIAGNOSIS — E89.0 S/P THYROIDECTOMY: ICD-10-CM

## 2024-01-08 DIAGNOSIS — H81.12 BPPV (BENIGN PAROXYSMAL POSITIONAL VERTIGO), LEFT: Primary | ICD-10-CM

## 2024-01-08 DIAGNOSIS — N95.1 MENOPAUSAL SYNDROME ON HORMONE REPLACEMENT THERAPY: ICD-10-CM

## 2024-01-08 DIAGNOSIS — M79.7 FIBROMYALGIA: ICD-10-CM

## 2024-01-08 DIAGNOSIS — M32.8 OTHER FORMS OF SYSTEMIC LUPUS ERYTHEMATOSUS, UNSPECIFIED ORGAN INVOLVEMENT STATUS (HCC): ICD-10-CM

## 2024-01-08 DIAGNOSIS — Z79.890 MENOPAUSAL SYNDROME ON HORMONE REPLACEMENT THERAPY: ICD-10-CM

## 2024-01-08 PROCEDURE — 3008F BODY MASS INDEX DOCD: CPT | Performed by: INTERNAL MEDICINE

## 2024-01-08 PROCEDURE — 3074F SYST BP LT 130 MM HG: CPT | Performed by: INTERNAL MEDICINE

## 2024-01-08 PROCEDURE — 3079F DIAST BP 80-89 MM HG: CPT | Performed by: INTERNAL MEDICINE

## 2024-01-08 PROCEDURE — 99214 OFFICE O/P EST MOD 30 MIN: CPT | Performed by: INTERNAL MEDICINE

## 2024-01-08 RX ORDER — MECLIZINE HYDROCHLORIDE 50 MG/1
25 TABLET ORAL 3 TIMES DAILY PRN
COMMUNITY
Start: 2023-12-28 | End: 2024-01-08

## 2024-01-08 RX ORDER — PREDNISONE 2.5 MG/1
2.5 TABLET ORAL DAILY
COMMUNITY
Start: 2024-01-05

## 2024-01-08 RX ORDER — PROPRANOLOL HYDROCHLORIDE 160 MG/1
160 CAPSULE, EXTENDED RELEASE ORAL DAILY
Qty: 90 CAPSULE | Refills: 1 | Status: SHIPPED | OUTPATIENT
Start: 2024-01-08 | End: 2024-07-06

## 2024-01-08 NOTE — PROGRESS NOTES
Amanda Elmore is a 53 year old female.       HPI:   Patient presents for the following issues.  BPPV - this started 4 weeks ago and felt room was spinning not herself. Sensation lasts a few seconds. Occurring multiple times/day. Particularly exacerbated when looking down or up. She has tried otc dramamine and has been doing HEP 3-4 times/day and she feels minimally better. Not associated with nauseau, vomiting, or vision changes. No associated light-headed. No falls or LOC. But she is losing her balance and running into things. No weakness.   Lupus - worsening inflammatory markers so rheumatology wants her to start prednisone and increase her methotrexate  HYpothyroidism - due for TSH  Migraines - propranolol 120 mg is helping but not eliminating migraines. She is still experiencing several migraines per week.     REVIEW OF SYSTEMS:   GENERAL HEALTH: feels well otherwise. No f/c  NEURO: see HPI  VISION: denies any blurred or double vision  RESPIRATORY: denies shortness of breath, cough, or congestion  CARDIOVASCULAR: denies chest pain, pressure or palpitations  GI: denies abdominal pain, diarrhea, n/v, BRBPR, melena. Chronic constipation is stable.   : no dysuria or hematuria  PSYCH: mood is stable  EXT: denies edema     Wt Readings from Last 6 Encounters:   01/08/24 202 lb 9.6 oz (91.9 kg)   10/09/23 208 lb (94.3 kg)   09/04/23 200 lb (90.7 kg)   08/29/23 204 lb 12.8 oz (92.9 kg)   07/31/23 202 lb 12.8 oz (92 kg)   06/26/23 202 lb 12.8 oz (92 kg)       Allergies   Allergen Reactions    Benlysta [Belimumab] PALPITATIONS    Botox [Botulinum Toxin Type A, Cosm] HIVES    Penicillins HIVES, OTHER (SEE COMMENTS) and RASH    Adhesive Tape OTHER (SEE COMMENTS)     Blister      Ultram [Tramadol] ITCHING       Family History   Problem Relation Age of Onset    Cancer Father         carcinoid tumor    Other Father         Carcinoid - abdominal area    Cancer Mother         breast cancer    Breast Cancer Mother     Colon  Polyps Mother     Cancer Maternal Grandmother         stomach cacner    Breast Cancer Maternal Aunt     Breast Cancer Maternal Aunt     Other Maternal Aunt         her daughter had ovarian cancer -     Colon Polyps Brother       Past Medical History:   Diagnosis Date    Abdominal pain August 15    Noticed a few weeks ago    Anemia     Back pain     Constipation Unsure    I don't go every day    Decorative tattoo     Several    Easy bruising     Fatigue Unsure    Been happening for awhile    Fibromyalgia     Fibromyalgia     Headache disorder Unsure    Being treated for migraines    Lupus (HCC)     Lupus (HCC)     Pain in joints     Shortness of breath     Sometimes not a regular thing    Sjogren's syndrome (HCC)     Sjogrens syndrome (HCC)     Skin blushing/flushing     I get hot flashes    Sleep apnea     Been awhile    Thyroid disease     Wears glasses       Past Surgical History:   Procedure Laterality Date    ANESTH, SECTION      BILAT SALPINGO-OOPHORECT W/ OMENTECT, TOTAL ABDOM HYSTE  2014    [facility]      , , and     MASTECTOMY LEFT  2015    MASTECTOMY MODIFIED RADICAL Bilateral 2015    MASTECTOMY RIGHT  2015    OTHER      partia hysterectomy    THYROIDECTOMY      THYROIDECTOMY  2012    TONSILLECTOMY      TOTAL ABDOM HYSTERECTOMY      Partial, tube and ovaries removed      Social History:    Social History     Socioeconomic History    Marital status:    Tobacco Use    Smoking status: Never    Smokeless tobacco: Never   Vaping Use    Vaping Use: Never used   Substance and Sexual Activity    Alcohol use: Never    Drug use: Yes     Types: Cannabis     Comment: marijuana gummies 1-2 times per week   Other Topics Concern    Caffeine Concern Yes     Comment: Maybe 8 oz couple days per week    Exercise No             EXAM:   /80 (BP Location: Left arm)   Pulse 67   Temp 96.9 °F (36.1 °C) (Temporal)   Resp 16   Ht 5' 7.5\"  (1.715 m)   Wt 202 lb 9.6 oz (91.9 kg)   SpO2 99%   BMI 31.26 kg/m²   GENERAL: A&O well developed, well nourished,in no apparent distress  SKIN: no rashes,no suspicious lesions  HEENT: atraumatic, MMM, throat is clear  NECK: supple, no jvd, no thyromegaly  LUNGS: clear to auscultation bilateraly, no c/w/r  CARDIO: RRR without g/m/r  GI: soft non tender nondistended no hsm bs throughout  NEURO: CN 2-12 grossly intact  PSYCH: pleasant  EXTREMITIES: no cyanosis, clubbing or edema  Oc Hallpike reproduced dizziness on left side. No nystagmus b/l      ASSESSMENT AND PLAN:   # Macrocytic Anemia: stable, continue to monitor  # Left sided BPPV - I re-instructed her on how to do epley maneuvery properly and referred her to vestibular rehab. Meclizine was not covered by insurance. She is using dramamine.   # Migraines: chronic, improving on propranolol but still very uncontrolled. Increase propranolol to 160 mg daily. They denied emgality again in 6/2023.  - were very well controlled on emgality but insurance will no longer cover it. - she had daily migraines prior to starting emgality. With emgality, migraines improved to 1 time per month or less.   - topamax caused worsening memory, botox caused an allergic reaction. Nortriptyline 10 and 25 mg (not effective and could not tolerate higher dosing). Gabapentin 3600 mg (not effective), baclofen 10 mg TID and orphenadrine 100 mg BID did not help.  # Obese, BMI 31:  Reinforced healthy plant based nutrition, regular exercise, and practicing mindfulness. We outlined small, achievable goals. She is also on a few medications which can cause weight gain, particularly, HRT but she needs these agents for her QOL.  # Chronic Constipation: continue high fiber nutrition, hydration, and activity and stool softners.   # BRCA 1+: underwent bilateral mastectomy and salpingo-oopherectomy in 2015.   # Thyroid Cancer s/p thyroidectomy now with post-surgical hypothyroidism: cont synthroid. TSH  was difficult to control on generic levothyroxine, needs brand name only. Following with endocrine.   # Lupus: following with rheumatology.  She is currently on HCQ only.   # Fibromyalgia: pain is stable. She prefers not to be on neuropathic agents.  Nutrition and exercise are key elements of living with fibromyalgia pain.   # Hormone Therapy: on therapy 2/2 bilateral oopherectomy. Ultimate goal is to d/c HRT but at this time quality of life >>risks of therapy.   # Osteopenia of Femoral Neck - low FRAX per DEXA in 2021. 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 Repeat in 5 years (2025)  Cervical Cancer Screening: complete pap smear today.   Breast Cancer Screening:  bilateral mastectomy  Bone Health/Fall Prevention (Akin Chi):   Vaccines:  TDAP 2020, pneumovac 23 2020. Up to date with shingrix.  cont flu and covid vaccines  Medical POA:  would be primary, resources provided        Care Team:  Rheumatology: Lindsay FOREMAN&R - Melinda Mahajan     The patient indicates understanding of these issues and agrees to the plan.  The patient is asked to return in 6/2024 for wellness exam  Aline Gordon MD

## 2024-01-25 ENCOUNTER — TELEPHONE (OUTPATIENT)
Dept: INTERNAL MEDICINE CLINIC | Facility: CLINIC | Age: 54
End: 2024-01-25

## 2024-01-25 NOTE — TELEPHONE ENCOUNTER
Rec'd FMLA forms via email.  Sent [employer] message for valid KWAKU/FCR and fax#. Logged for processing. Spoke with pt who provided case #[id number].

## 2024-02-01 ENCOUNTER — PATIENT MESSAGE (OUTPATIENT)
Dept: INTERNAL MEDICINE CLINIC | Facility: CLINIC | Age: 54
End: 2024-02-01

## 2024-02-02 RX ORDER — GALCANEZUMAB 120 MG/ML
120 INJECTION, SOLUTION SUBCUTANEOUS
Qty: 3 ML | Refills: 3 | Status: SHIPPED | OUTPATIENT
Start: 2024-02-02 | End: 2024-03-01

## 2024-02-02 NOTE — TELEPHONE ENCOUNTER
From: Amanda Elmore  To: Aline Gordon  Sent: 2/1/2024 5:08 PM CST  Subject: Emgality Pre Authorization     Good afternoon, I am trying to fill my prescription for Emgality however [employer] has informed me that they need a pre authorization for it. Have they contacted you about it or has one already been approved?    Thank you   Rosa Elmore

## 2024-02-05 NOTE — TELEPHONE ENCOUNTER
Prior authorization approved   Case ID: [id number]      Payer: [facility]    [phone]   Request Reference Number: [id number].  EMGALITY     INJ 120MG/ML is denied for not meeting the prior authorization requirement(s).  Details of this decision are in the notice attached below or have been faxed to you.   Approval Details    Authorization number: [id number]   Authorized from February 2, 2024 to February 2, 2025      Electronic appeal: Not supported   Appeals are not supported through ePA. Please refer to the fax case notice for appeals information and instructions.   View History     Pharmacy informed   MCM sent

## 2024-02-09 NOTE — TELEPHONE ENCOUNTER
Dr. Gordon,    Pt is seeking intermittent  FMLA for Fibromyalgia and Migraines start 2/10/24 - 2/10/25/1 yr.    Pt is seeking  8 days per month for appts and flare-ups.  Each flare-up lasting 24hrs.  Do you support?    Thank you  Danitza MCLEAN

## 2024-02-12 NOTE — TELEPHONE ENCOUNTER
Please pass on my previous message regarding [facility] paperwork to the centralized paperwork people.

## 2024-02-21 ENCOUNTER — HOSPITAL ENCOUNTER (OUTPATIENT)
Age: 54
Discharge: HOME | End: 2024-02-21
Attending: EMERGENCY MEDICINE
Payer: COMMERCIAL

## 2024-02-21 ENCOUNTER — APPOINTMENT (OUTPATIENT)
Dept: GENERAL RADIOLOGY | Age: 54
End: 2024-02-21
Attending: EMERGENCY MEDICINE
Payer: COMMERCIAL

## 2024-02-21 VITALS
TEMPERATURE: 98 F | WEIGHT: 200 LBS | BODY MASS INDEX: 30.31 KG/M2 | DIASTOLIC BLOOD PRESSURE: 72 MMHG | OXYGEN SATURATION: 99 % | SYSTOLIC BLOOD PRESSURE: 132 MMHG | RESPIRATION RATE: 16 BRPM | HEART RATE: 55 BPM | HEIGHT: 68 IN

## 2024-02-21 DIAGNOSIS — R07.9 CHEST PAIN OF UNCERTAIN ETIOLOGY: Primary | ICD-10-CM

## 2024-02-21 DIAGNOSIS — R00.2 PALPITATIONS: ICD-10-CM

## 2024-02-21 LAB
#MXD IC: 0.4 X10ˆ3/UL (ref 0.1–1)
BUN BLD-MCNC: 17 MG/DL (ref 7–18)
CHLORIDE BLD-SCNC: 105 MMOL/L (ref 98–112)
CO2 BLD-SCNC: 26 MMOL/L (ref 21–32)
CREAT BLD-MCNC: 1 MG/DL
DDIMER WHOLE BLOOD: <200 NG/ML DDU (ref ?–400)
EGFRCR SERPLBLD CKD-EPI 2021: 67 ML/MIN/1.73M2 (ref 60–?)
GLUCOSE BLD-MCNC: 80 MG/DL (ref 70–99)
HCT VFR BLD AUTO: 36.4 %
HCT VFR BLD CALC: 35 %
HGB BLD-MCNC: 12.2 G/DL
ISTAT IONIZED CALCIUM FOR CHEM 8: 1.2 MMOL/L (ref 1.12–1.32)
LYMPHOCYTES # BLD AUTO: 1.2 X10ˆ3/UL (ref 1–4)
LYMPHOCYTES NFR BLD AUTO: 35.7 %
MCH RBC QN AUTO: 32.7 PG (ref 26–34)
MCHC RBC AUTO-ENTMCNC: 33.5 G/DL (ref 31–37)
MCV RBC AUTO: 97.6 FL (ref 80–100)
MIXED CELL %: 11.6 %
NEUTROPHILS # BLD AUTO: 1.8 X10ˆ3/UL (ref 1.5–7.7)
NEUTROPHILS NFR BLD AUTO: 52.7 %
PLATELET # BLD AUTO: 171 X10ˆ3/UL (ref 150–450)
POTASSIUM BLD-SCNC: 4.1 MMOL/L (ref 3.6–5.1)
RBC # BLD AUTO: 3.73 X10ˆ6/UL
SODIUM BLD-SCNC: 140 MMOL/L (ref 136–145)
TROPONIN I BLD-MCNC: <0.02 NG/ML
WBC # BLD AUTO: 3.4 X10ˆ3/UL (ref 4–11)

## 2024-02-21 PROCEDURE — 99215 OFFICE O/P EST HI 40 MIN: CPT

## 2024-02-21 PROCEDURE — 84484 ASSAY OF TROPONIN QUANT: CPT

## 2024-02-21 PROCEDURE — 71046 X-RAY EXAM CHEST 2 VIEWS: CPT | Performed by: EMERGENCY MEDICINE

## 2024-02-21 PROCEDURE — 85378 FIBRIN DEGRADE SEMIQUANT: CPT | Performed by: EMERGENCY MEDICINE

## 2024-02-21 PROCEDURE — 36415 COLL VENOUS BLD VENIPUNCTURE: CPT

## 2024-02-21 PROCEDURE — 85025 COMPLETE CBC W/AUTO DIFF WBC: CPT | Performed by: EMERGENCY MEDICINE

## 2024-02-21 PROCEDURE — 93005 ELECTROCARDIOGRAM TRACING: CPT

## 2024-02-21 PROCEDURE — 93010 ELECTROCARDIOGRAM REPORT: CPT

## 2024-02-21 PROCEDURE — 80047 BASIC METABLC PNL IONIZED CA: CPT

## 2024-02-21 NOTE — DISCHARGE INSTRUCTIONS
I recommend you follow-up with your primary care doctor regarding your symptoms today.  There is no evidence of an emergent condition at this time but further testing may be needed.  Your primary care doctor may arrange for you to wear an outpatient cardiac event monitor (Holter monitor) for further evaluation of your palpitations.  On the monitor here in the immediate care these appear as though they may be consistent with PVC/PACs.

## 2024-02-21 NOTE — ED INITIAL ASSESSMENT (HPI)
Pt states two half weeks ago discomfort started and it has become worse. Feels short of breath and heavy. And left arm hurts.

## 2024-02-21 NOTE — ED PROVIDER NOTES
Patient Seen in: [facility]      History     Chief Complaint   Patient presents with    Chest Pain Angina     Stated Complaint: Chest pain/ Armpit hurts    Subjective:   HPI    53-year-old female presents with 2 separate complaints.  1 is an intermittent palpitation.  Over the last 2 weeks she describes a single abnormal beat intermittently that gives her discomfort in the chest.  She says it happens numerous times throughout the day.  No associated lightheadedness.  No runs of palpitations.  She also reports a pain under her left armpit today that radiates down the left upper extremity.  She also feels like she is out of breath today.  Reports dyspnea with all activities.  Denies any cough or congestion.  No fevers.  She has no cardiac risk factors.  No history of hypertension, hyperlipidemia, diabetes.  Non-smoker.  No family history of cardiac disease.    Objective:   Past Medical History:   Diagnosis Date    Abdominal pain August 15    Noticed a few weeks ago    Anemia     Back pain     Constipation Unsure    I don't go every day    Decorative tattoo     Several    Easy bruising     Fatigue Unsure    Been happening for awhile    Fibromyalgia     Fibromyalgia     Headache disorder Unsure    Being treated for migraines    Lupus (HCC)     Lupus (HCC)     Pain in joints     Shortness of breath     Sometimes not a regular thing    Sjogren's syndrome (HCC)     Sjogrens syndrome (HCC)     Skin blushing/flushing     I get hot flashes    Sleep apnea     Been awhile    Thyroid disease     Wears glasses               Past Surgical History:   Procedure Laterality Date    ANESTH, SECTION      BILAT SALPINGO-OOPHORECT W/ OMENTECT, TOTAL ABDOM HYSTE  2014    [facility]      , , and     MASTECTOMY LEFT  2015    MASTECTOMY MODIFIED RADICAL Bilateral 2015    MASTECTOMY RIGHT  2015    OTHER      partia hysterectomy    THYROIDECTOMY      THYROIDECTOMY   02/27/2012    TONSILLECTOMY      TOTAL ABDOM HYSTERECTOMY      Partial, tube and ovaries removed                Social History     Socioeconomic History    Marital status:    Tobacco Use    Smoking status: Never    Smokeless tobacco: Never   Vaping Use    Vaping Use: Never used   Substance and Sexual Activity    Alcohol use: Never    Drug use: Not Currently     Types: Cannabis   Other Topics Concern    Caffeine Concern Yes     Comment: Maybe 8 oz couple days per week    Exercise No              Review of Systems    Positive for stated complaint: Chest pain/ Armpit hurts  Other systems are as noted in HPI.  Constitutional and vital signs reviewed.      All other systems reviewed and negative except as noted above.    Physical Exam     ED Triage Vitals [02/21/24 1429]   BP (!) 159/91   Pulse 64   Resp 20   Temp    Temp src Tympanic   SpO2 99 %   O2 Device None (Room air)       Current:BP (!) 159/91   Pulse 64   Resp 20   Ht 172.7 cm (5' 8\")   Wt 90.7 kg   SpO2 99%   BMI 30.41 kg/m²         Physical Exam    General:  Vitals as listed.  No acute distress   HEENT: Sclerae anicteric.  Conjunctivae show no pallor.  Oropharynx clear, mucous membranes moist   Neck: supple, no rigidity   Lungs: good air exchange and clear   Heart: regular rate rhythm and no murmur   Abdomen: Soft and nontender.  No abdominal masses.  No peritoneal signs   MSK: There is tenderness on palpation over the ribs along the anterior axillary line.  Palpation of this area at least partially reproduces the symptoms of pain she is having in this region.  No pain with range of motion.  No edema, normal peripheral pulses   Neuro: Alert oriented and nonfocal   Skin: no rashes or nodules    ED Course     Labs Reviewed   POCT CBC - Abnormal; Notable for the following components:       Result Value    WBC IC 3.4 (*)     RBC IC 3.73 (*)     All other components within normal limits   D-DIMER (POC) - Normal   POCT ISTAT CHEM8 CARTRIDGE - Normal    ISTAT TROPONIN - Normal     EKG    Rate, intervals and axes as noted on EKG Report.  Rate: 57  Rhythm: Sinus Rhythm  Reading: No ST elevation MI.  Sinus bradycardia                 XR CHEST PA + LAT CHEST (CPT=71046)    Result Date: 2/21/2024  PROCEDURE:  XR CHEST PA + LAT CHEST (CPT=71046)  INDICATIONS:  Chest pain/ Armpit hurts  COMPARISON:  [facility], XR, XR CHEST PA + LAT CHEST (CPT=71046), 2/25/2022, 4:31 PM.  [place], XR, XR RIBS WITH CHEST (3 VIEWS), RIGHT  (CPT=71101), 1/08/2020, 6:23 PM.  TECHNIQUE:  PA and lateral chest radiographs were obtained.  PATIENT STATED HISTORY: (As transcribed by Technologist)  Patient c/o chest pain and left arm pain that began today. Patient stated that she feels \"winded\". Patient has history of double mastectomy 8 years ago and has implants    FINDINGS:  LUNGS:  No focal consolidation.  Normal vascularity. CARDIAC:  Normal size cardiac silhouette. MEDIASTINUM:  Normal. PLEURA:  Normal.  No pleural effusions. BONES:  Normal for age.            CONCLUSION:  No acute cardiopulmonary findings.   LOCATION:  [facility]   Dictated by (CST): Renny Gutierrez MD on 2/21/2024 at 4:28 PM     Finalized by (CST): Renny Gutierrez MD on 2/21/2024 at 4:28 PM               MDM      53-year-old female presents reporting 2 weeks of intermittent palpitations that she describes as a single abnormal beat.  She also reports pain to the left axillary region that radiates into the left arm.  There is some reproducible tenderness on palpation of this area.  She has a history of lupus and fibromyalgia and said she has frequent pains.  However, she does feel more out of breath the last couple of days.    Differential includes but is not limited to musculoskeletal pain, palpitations, PVCs, arrhythmia, cardiac ischemia, pulmonary embolism, a life threat.    CBC, CMP, troponin, D-dimer, EKG ordered for further evaluation.    My independent interpretation of chest x-ray is that there is no  focal pneumonia or pneumothorax.    Troponin within normal limits and there is no evidence of acute ischemia.  D-dimer is negative and does not suggest possibility of pulmonary embolism.  Chest x-ray with no acute findings.  On the monitor the patient appears to be having occasional PVC or PACs.  Reassured patient that these are not dangerous.  Instructed to follow-up with primary care doctor for further evaluation.  Discussed the possibility of an outpatient cardiac event monitor.  Should continue to monitor symptoms and return with worsening or with any concerns.                                       Medical Decision Making      Disposition and Plan     Clinical Impression:  1. Chest pain of uncertain etiology    2. Palpitations         Disposition:  Discharge  2/21/2024  4:55 pm    Follow-up:  Aline Gordon MD  [address] [address] [phone]    Schedule an appointment as soon as possible for a visit             Medications Prescribed:  Current Discharge Medication List

## 2024-02-22 LAB
ATRIAL RATE: 57 BPM
P AXIS: 52 DEGREES
P-R INTERVAL: 188 MS
Q-T INTERVAL: 442 MS
QRS DURATION: 92 MS
QTC CALCULATION (BEZET): 430 MS
R AXIS: -33 DEGREES
T AXIS: 46 DEGREES
VENTRICULAR RATE: 57 BPM

## 2024-02-26 ENCOUNTER — OFFICE VISIT (OUTPATIENT)
Dept: INTERNAL MEDICINE CLINIC | Facility: CLINIC | Age: 54
End: 2024-02-26
Payer: COMMERCIAL

## 2024-02-26 VITALS
HEART RATE: 55 BPM | RESPIRATION RATE: 16 BRPM | BODY MASS INDEX: 30.16 KG/M2 | SYSTOLIC BLOOD PRESSURE: 110 MMHG | TEMPERATURE: 98 F | OXYGEN SATURATION: 97 % | WEIGHT: 199 LBS | HEIGHT: 68 IN | DIASTOLIC BLOOD PRESSURE: 72 MMHG

## 2024-02-26 DIAGNOSIS — R00.2 PALPITATIONS: Primary | ICD-10-CM

## 2024-02-26 DIAGNOSIS — M32.8 OTHER FORMS OF SYSTEMIC LUPUS ERYTHEMATOSUS, UNSPECIFIED ORGAN INVOLVEMENT STATUS (HCC): ICD-10-CM

## 2024-02-26 DIAGNOSIS — G43.709 CHRONIC MIGRAINE WITHOUT AURA WITHOUT STATUS MIGRAINOSUS, NOT INTRACTABLE: ICD-10-CM

## 2024-02-26 PROCEDURE — 99214 OFFICE O/P EST MOD 30 MIN: CPT | Performed by: INTERNAL MEDICINE

## 2024-02-26 RX ORDER — BELIMUMAB 200 MG/ML
0.25 SOLUTION SUBCUTANEOUS WEEKLY
COMMUNITY
Start: 2024-02-20

## 2024-02-26 RX ORDER — MELOXICAM 7.5 MG/1
7.5 TABLET ORAL DAILY PRN
COMMUNITY
Start: 2024-01-23

## 2024-02-26 NOTE — PROGRESS NOTES
Amanda Elmore is a 53 year old female.       HPI:   Patient presents for the following issues.  Chest symptoms - started 3 weeks ago. Feels like a delayed heartbeat. Occurring all day long. It is intermittent and not daily. She thinks it is due to benlysta which she started 4 weeks ago. Seems to be more prominent a few days after taking injection. Not waking her up but makes it harder to fall asleep. Also associated with chest pain. She presented to IC and w/u was normal. Noticed some LOZADA. No associated paresthesias.   Lupus - started on benlysta 4 weeks ago. Too early to say if it is helping  Migraines - she has been able to fill emgality but she has not started it. We increased propranolol to 160 mg 6-7 weeks ago. Since then she has had 5 headaches. Needed sumatriptan for 3 of the headaches. 2 headaches resolved without meds. Tylenol resolved one headache. She has not missed any work this year. Emgalisty almost completely resolved her headaches in past but she has not started it yet because wanted to adjust to benlysta.     REVIEW OF SYSTEMS:   GENERAL HEALTH: feels well otherwise. No f/c  NEURO: denies any LH, dizzyness, LOC, falls  VISION: denies any blurred or double vision  RESPIRATORY: denies cough, or congestion  CARDIOVASCULAR: see HPI  GI: denies abdominal pain,  diarrhea, n/v, BRBPR, melena. Chronic, stable constipation  : no dysuria or hematuria  SKIN: denies any unusual skin lesions or rashes  PSYCH: mood is - Noticing a bit more anxiety and tearing since starting benlysta  EXT: denies edema       Wt Readings from Last 6 Encounters:   02/26/24 199 lb (90.3 kg)   02/21/24 200 lb (90.7 kg)   01/08/24 202 lb 9.6 oz (91.9 kg)   10/09/23 208 lb (94.3 kg)   09/04/23 200 lb (90.7 kg)   08/29/23 204 lb 12.8 oz (92.9 kg)       Allergies   Allergen Reactions    Benlysta [Belimumab] PALPITATIONS    Botox [Botulinum Toxin Type A, Cosm] HIVES    Penicillins HIVES, OTHER (SEE COMMENTS) and RASH    Adhesive Tape  OTHER (SEE COMMENTS)     Blister      Ultram [Tramadol] ITCHING       Family History   Problem Relation Age of Onset    Cancer Father         carcinoid tumor    Other Father         Carcinoid - abdominal area    Cancer Mother         breast cancer    Breast Cancer Mother     Colon Polyps Mother     Cancer Maternal Grandmother         stomach cacner    Breast Cancer Maternal Aunt     Breast Cancer Maternal Aunt     Other Maternal Aunt         her daughter had ovarian cancer -     Colon Polyps Brother       Past Medical History:   Diagnosis Date    Abdominal pain August 15    Noticed a few weeks ago    Anemia     Back pain     Constipation Unsure    I don't go every day    Decorative tattoo     Several    Easy bruising     Fatigue Unsure    Been happening for awhile    Fibromyalgia     Fibromyalgia     Headache disorder Unsure    Being treated for migraines    Lupus (HCC)     Lupus (HCC)     Pain in joints     Shortness of breath     Sometimes not a regular thing    Sjogren's syndrome (HCC)     Sjogrens syndrome (HCC)     Skin blushing/flushing     I get hot flashes    Sleep apnea     Been awhile    Thyroid disease     Wears glasses       Past Surgical History:   Procedure Laterality Date    ANESTH, SECTION      BILAT SALPINGO-OOPHORECT W/ OMENTECT, TOTAL ABDOM HYSTE  2014    [facility]      1993, , and 2005    MASTECTOMY LEFT  2015    MASTECTOMY MODIFIED RADICAL Bilateral 2015    MASTECTOMY RIGHT  2015    OTHER      partia hysterectomy    THYROIDECTOMY      THYROIDECTOMY  2012    TONSILLECTOMY      TOTAL ABDOM HYSTERECTOMY      Partial, tube and ovaries removed      Social History:    Social History     Socioeconomic History    Marital status:    Tobacco Use    Smoking status: Never    Smokeless tobacco: Never   Vaping Use    Vaping Use: Never used   Substance and Sexual Activity    Alcohol use: Never    Drug use: Not Currently     Types:  Cannabis   Other Topics Concern    Caffeine Concern Yes     Comment: Maybe 8 oz couple days per week    Exercise No               EXAM:   /72   Pulse 55   Temp 98.3 °F (36.8 °C) (Temporal)   Resp 16   Ht 5' 8\" (1.727 m)   Wt 199 lb (90.3 kg)   SpO2 97%   BMI 30.26 kg/m²   GENERAL: A&O well developed, well nourished,in no apparent distress  SKIN: no rashes,no suspicious lesions  HEENT: atraumatic, MMM, throat is clear  NECK: supple, no jvd, no thyromegaly  LUNGS: clear to auscultation bilateraly, no c/w/r  CARDIO: RRR without g/m/r  GI: soft non tender nondistended no hsm bs throughout  NEURO: CN 2-12 grossly intact  PSYCH: pleasant  EXTREMITIES: no cyanosis, clubbing or edema      ASSESSMENT AND PLAN:   # Palpitations and chest pain - she believes it is due to benlysta though not listed as a common s/e. Check TSH and heart monitor  # Lupus - on hcq and rheum has added benlysta  # Macrocytic Anemia: stable, continue to monitor  # Migraines: chronic, has not missed work this year due to migraines. Prpranolol 160 mg is helping but emgality was most effective and is now finally affordable for her. Once she starts emgality we hope to discontinue propranolol.   - topamax caused worsening memory, botox caused an allergic reaction. Nortriptyline 10 and 25 mg (not effective and could not tolerate higher dosing). Gabapentin 3600 mg (not effective), baclofen 10 mg TID and orphenadrine 100 mg BID did not help.  # Obese, BMI 30:  Reinforced healthy plant based nutrition, regular exercise, and practicing mindfulness. We outlined small, achievable goals. She is also on a few medications which can cause weight gain, particularly, HRT but she needs these agents for her QOL.  # Chronic Constipation: continue high fiber nutrition, hydration, and activity and stool softners.   # BRCA 1+: underwent bilateral mastectomy and salpingo-oopherectomy in 2015.   # Thyroid Cancer s/p thyroidectomy now with post-surgical  hypothyroidism: cont synthroid. TSH was difficult to control on generic levothyroxine, needs brand name only. Following with endocrine.   # Fibromyalgia: pain is stable. She prefers not to be on neuropathic agents.  Nutrition and exercise are key elements of living with fibromyalgia pain.   # Hormone Therapy: on therapy 2/2 bilateral oopherectomy. Ultimate goal is to d/c HRT but at this time quality of life >>risks of therapy.   # Osteopenia of Femoral Neck - low FRAX per DEXA in 2021. 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 Repeat in 5 years (2025)  Cervical Cancer Screening: complete pap smear today.   Breast Cancer Screening:  bilateral mastectomy  Bone Health/Fall Prevention ([facility]kin Chi):   Vaccines:  TDAP 2020, pneumovac 23 2020. Up to date with shingrix.  cont flu and covid vaccines  Medical POA:  would be primary, resources provided        Care Team:  Rheumatology: Lindsay FOREMAN&R Aleisha Mahajan     The patient indicates understanding of these issues and agrees to the plan.  The patient is asked to return in June, 2024 for wellness exam  Aline Gordon MD

## 2024-02-29 ENCOUNTER — LAB ENCOUNTER (OUTPATIENT)
Dept: LAB | Age: 54
End: 2024-02-29
Attending: INTERNAL MEDICINE
Payer: COMMERCIAL

## 2024-02-29 DIAGNOSIS — R00.2 PALPITATIONS: ICD-10-CM

## 2024-02-29 PROCEDURE — 36415 COLL VENOUS BLD VENIPUNCTURE: CPT

## 2024-02-29 PROCEDURE — 80061 LIPID PANEL: CPT

## 2024-02-29 PROCEDURE — 84439 ASSAY OF FREE THYROXINE: CPT

## 2024-02-29 PROCEDURE — 84443 ASSAY THYROID STIM HORMONE: CPT

## 2024-03-01 LAB
CHOLEST SERPL-MCNC: 189 MG/DL (ref ?–200)
FASTING PATIENT LIPID ANSWER: YES
HDLC SERPL-MCNC: 74 MG/DL (ref 40–59)
LDLC SERPL CALC-MCNC: 99 MG/DL (ref ?–100)
NONHDLC SERPL-MCNC: 115 MG/DL (ref ?–130)
T4 FREE SERPL-MCNC: 1.3 NG/DL (ref 0.8–1.7)
TRIGL SERPL-MCNC: 89 MG/DL (ref 30–149)
TSI SER-ACNC: 2.81 MIU/ML (ref 0.36–3.74)
VLDLC SERPL CALC-MCNC: 15 MG/DL (ref 0–30)

## 2024-03-12 ENCOUNTER — HOSPITAL ENCOUNTER (OUTPATIENT)
Dept: CV DIAGNOSTICS | Facility: HOSPITAL | Age: 54
Discharge: HOME | End: 2024-03-12
Attending: INTERNAL MEDICINE
Payer: COMMERCIAL

## 2024-03-12 DIAGNOSIS — R00.2 PALPITATIONS: ICD-10-CM

## 2024-03-12 PROCEDURE — 93242 EXT ECG>48HR<7D RECORDING: CPT | Performed by: INTERNAL MEDICINE

## 2024-03-12 PROCEDURE — 93243 EXT ECG>48HR<7D SCAN A/R: CPT | Performed by: INTERNAL MEDICINE

## 2024-04-02 RX ORDER — MELOXICAM 7.5 MG/1
7.5 TABLET ORAL DAILY
Qty: 30 TABLET | Refills: 1 | Status: SHIPPED | OUTPATIENT
Start: 2024-04-02

## 2024-04-02 NOTE — TELEPHONE ENCOUNTER
Rc'd fax from [facility] pharmacy for refill on -  Meloxicam 7.5mg take 1 tablet by mouth daily. #30 w/ 1 refill.     Pt last seen on 12/20/23  Last rx given 12/20/23 #30 with 1 refill  Pending rx for your review and signature if you approve?

## 2024-05-28 RX ORDER — PROPRANOLOL HYDROCHLORIDE 160 MG/1
CAPSULE, EXTENDED RELEASE ORAL
Qty: 90 CAPSULE | Refills: 3 | Status: SHIPPED | OUTPATIENT
Start: 2024-05-28

## 2024-05-29 ENCOUNTER — OFFICE VISIT (OUTPATIENT)
Facility: LOCATION | Age: 54
End: 2024-05-29

## 2024-05-29 VITALS — SYSTOLIC BLOOD PRESSURE: 128 MMHG | DIASTOLIC BLOOD PRESSURE: 80 MMHG

## 2024-05-29 DIAGNOSIS — M20.5X1 HALLUX LIMITUS, RIGHT: Primary | ICD-10-CM

## 2024-05-29 DIAGNOSIS — M20.11 HALLUX VALGUS, RIGHT: ICD-10-CM

## 2024-05-29 DIAGNOSIS — R60.0 EDEMA OF RIGHT FOOT: ICD-10-CM

## 2024-05-29 DIAGNOSIS — M79.671 RIGHT FOOT PAIN: ICD-10-CM

## 2024-05-29 PROCEDURE — 20600 DRAIN/INJ JOINT/BURSA W/O US: CPT | Performed by: PODIATRIST

## 2024-05-29 RX ORDER — DEXAMETHASONE SODIUM PHOSPHATE 10 MG/ML
10 INJECTION INTRAMUSCULAR; INTRAVENOUS ONCE
Status: COMPLETED | OUTPATIENT
Start: 2024-05-29 | End: 2024-05-29

## 2024-05-29 RX ADMIN — DEXAMETHASONE SODIUM PHOSPHATE 10 MG: 10 INJECTION INTRAMUSCULAR; INTRAVENOUS at 09:27:00

## 2024-05-29 NOTE — PROGRESS NOTES
[facility]  Progress Note    Amanda Elmore is a 53 year old female.   Chief Complaint   Patient presents with    Foot Pain     R foot f/u - Injection given 11/28/23. Some numbness and tingling in big toe. Pain onset  2 weeks ago.            HPI:     Patient is a pleasant 53-year-old female who is returning to clinic today with complaints of worsening pain to her right great toe joint.  Patient was seen in December 2023 and did receive an injection to her right great toe joint, which did give her relief until roughly 2 weeks ago.  She states that she has noticed an achy pain starting surrounding the joint.  She believes it is affecting her whole foot and is causing her to limp.  Rates the pain 2/10 when sitting, but does worsen with ambulation.  Denies any recent injuries.  She does have some numbness/tingling in the big toe.  She states that it was swollen, which has slightly improved.  No other concerns at this time is presenting today for further evaluation and care.  Denies recent nausea, vomiting, fever, chills.      Allergies: Benlysta [belimumab]; Botox [botulinum toxin type a, cosm]; Penicillins; Adhesive tape; and Ultram [tramadol]   Current Outpatient Medications   Medication Sig Dispense Refill    PROPRANOLOL HCL  MG Oral Capsule SR 24 Hr TAKE 1 CAPSULE BY MOUTH DAILY  REPLACES 120 MG DOSE 90 capsule 3    Meloxicam 7.5 MG Oral Tab Take 1 tablet (7.5 mg total) by mouth daily. 30 tablet 1    BENLYSTA 200 MG/ML Subcutaneous Solution Auto-injector Inject 0.25 mg into the skin once a week.      Meloxicam 7.5 MG Oral Tab Take 1 tablet (7.5 mg total) by mouth daily as needed for Pain.      methotrexate 2.5 MG Oral Tab TAKE 5 TABLETS BY MOUTH EVERY WEEK ON WEDNESDAY      Estradiol-Norethindrone Acet 0.5-0.1 MG Oral Tab Take 1 tablet by mouth daily. 84 tablet 3    SUMAtriptan 25 MG Oral Tab Take 1 tablet (25 mg total) by mouth every 2 (two) hours as needed for Migraine. MAX 200MG/24 HRS 15  tablet 3    levothyroxine 88 MCG Oral Tab Take 1 tablet (88 mcg total) by mouth daily. 90 tablet 0    Sodium Fluoride 1.1 % Dental Gel USE ONCE DAILY IN PLACE OF YOUR REGULAR TOOTHPASTE      folic acid 1 MG Oral Tab 3 tabs per day (Patient taking differently: 3 tablets (3 mg total). 3 tabs per day) 270 tablet 3    HYDROXYCHLOROQUINE SULFATE 200 MG Oral Tab TAKE 1 TABLET EVERY 12 HOURS. TAKE WITH FOOD 180 tablet 3    hydrocortisone 2.5 % External Cream Apply 1 Application topically 2 (two) times daily. Apply every morning and evening. 28 g 1    Cholecalciferol (VITAMIN D) 50 MCG (2000 UT) Oral Cap Take by mouth daily.      Multiple Vitamins-Minerals (MULTIVITAMIN ADULT OR) Take by mouth daily.        Past Medical History:    Abdominal pain    Noticed a few weeks ago    Anemia    Back pain    Constipation    I don't go every day    Decorative tattoo    Several    Easy bruising    Fatigue    Been happening for awhile    Fibromyalgia    Fibromyalgia    Headache disorder    Being treated for migraines    Lupus (HCC)    Lupus (HCC)    Pain in joints    Shortness of breath    Sometimes not a regular thing    Sjogren's syndrome (HCC)    Sjogrens syndrome (HCC)    Skin blushing/flushing    I get hot flashes    Sleep apnea    Been awhile    Thyroid disease    Wears glasses      Past Surgical History:   Procedure Laterality Date    Anesth, section      Bilat salpingo-oophorect w/ omentect, total abdom hyste  2014    [facility]      , , and     Mastectomy left  2015    Mastectomy modified radical Bilateral 2015    Mastectomy right  2015    Other      partia hysterectomy    Thyroidectomy      Thyroidectomy  2012    Tonsillectomy      Total abdom hysterectomy      Partial, tube and ovaries removed      Family History   Problem Relation Age of Onset    Cancer Father         carcinoid tumor    Other Father         Carcinoid - abdominal area    Cancer Mother          breast cancer    Breast Cancer Mother     Colon Polyps Mother     Cancer Maternal Grandmother         stomach cacner    Breast Cancer Maternal Aunt     Breast Cancer Maternal Aunt     Other Maternal Aunt         her daughter had ovarian cancer - June    Colon Polyps Brother       Social History     Socioeconomic History    Marital status:    Tobacco Use    Smoking status: Never    Smokeless tobacco: Never   Vaping Use    Vaping status: Never Used   Substance and Sexual Activity    Alcohol use: Never    Drug use: Not Currently     Types: Cannabis   Other Topics Concern    Caffeine Concern Yes     Comment: Maybe 8 oz couple days per week    Exercise No           REVIEW OF SYSTEMS:     10 point ROS completed and was negative, except for pertinent positive and negatives stated in subjective.       EXAM:     GENERAL: well developed, well nourished, in no apparent distress  EXTREMITIES:  1. Integument: Skin appears moist, warm, and supple with positive hair growth. There are no color changes. No open lesions. No macerations. No Hyperkeratotic lesions.   2. Vascular: Dorsalis pedis 2/4 bilateral and posterior tibial pulses 2/4 bilateral, capillary refill normal.  Mild localized edema to area of 1st MPJ, right.  3. Neurological: Gross sensation intact via light touch bilaterally.  Normal sharp/dull sensation  4. Musculoskeletal: Pain at end range of motion of right first MPJ with DF and PF.  Mild pain with palpation to medial prominence of right first MPJ.  All muscle groups are graded 5/5 in the foot and ankle.       ASSESSMENT AND PLAN:   Diagnoses and all orders for this visit:    Hallux limitus, right    Hallux valgus, right    Edema of right foot    Right foot pain        Plan:   -Patient was seen and evaluated today in clinic.  Chart history reviewed.    Discussed the etiology of this condition as well as both conservative and surgical treatment options.  Patient is aware of her arthritic changes to her right  first MPJ.  Discussed conservative measures to include wide shoes, extra depth shoes, padding, offloading, orthotics, anti-inflammatory medication, and/or steroid injections.  I did provide patient with over-the-counter supportive insert information today.  Also discussed trying an insert with Lee's extension  In regards to surgical intervention, the potential procedure was discussed, demonstrated on patients foot.  Due to this condition being of structural origin, only surgical intervention will allow for correction of the deformity.   Patient saw improvement for several months following her last injection.  Patient did verbalize that she is wanting to move forward with another injection today.  PROCEDURE: 20600-right first metatarsal phalangeal joint  After reviewing options for cortisone injection and risks/potential complications, pt has agreed to proceed.  Using aseptic technique, injection was then given using 1cc of 10mg/ml Dexamethasone, 1 cc of Kenalog 10, 0.5 cc of 1% lidocaine plain with 0.5 cc of 0.5% marcaine plain using a 25 gauge 1.5\" needle.  Site of infiltration -right first metatarsal phalangeal joint  Site covered with sterile bandaid  Pt tolerated procedure well and no complications encountered    -The patient indicates understanding of these issues and agrees to the plan.    Time spent reviewing pertinent information from patient's chart, reviewing any pertinent imaging, obtaining history and physical exam, discussing and mutually agreeing on a treatment plan, and documenting encounter: 20 minutes    RTC as needed      Anand Gautam DPM        5/29/2024    Dragon speech recognition software was used to prepare this note.  Errors in word recognition may occur.  Please contact me with any questions/concerns with this note.

## 2024-05-29 NOTE — PROGRESS NOTES
Per verbal order of , draw up 0.5ml of Lido, 0.5ml of Marcaine, 1ml of Ken10 and 1ml Dexamethasone for a right hallux.

## 2024-06-18 ENCOUNTER — HOSPITAL ENCOUNTER (OUTPATIENT)
Age: 54
Discharge: HOME | End: 2024-06-18

## 2024-06-18 VITALS
HEIGHT: 68 IN | WEIGHT: 200 LBS | RESPIRATION RATE: 16 BRPM | OXYGEN SATURATION: 99 % | DIASTOLIC BLOOD PRESSURE: 71 MMHG | SYSTOLIC BLOOD PRESSURE: 125 MMHG | TEMPERATURE: 98 F | BODY MASS INDEX: 30.31 KG/M2 | HEART RATE: 81 BPM

## 2024-06-18 DIAGNOSIS — H01.112 ALLERGIC DERMATITIS OF BOTH LOWER EYELIDS: Primary | ICD-10-CM

## 2024-06-18 DIAGNOSIS — H01.115 ALLERGIC DERMATITIS OF BOTH LOWER EYELIDS: Primary | ICD-10-CM

## 2024-06-18 PROCEDURE — 99203 OFFICE O/P NEW LOW 30 MIN: CPT | Performed by: PHYSICIAN ASSISTANT

## 2024-06-18 RX ORDER — PREDNISONE 20 MG/1
40 TABLET ORAL DAILY
Qty: 10 TABLET | Refills: 0 | Status: SHIPPED | OUTPATIENT
Start: 2024-06-18 | End: 2024-06-23

## 2024-06-18 RX ORDER — MELOXICAM 7.5 MG/1
7.5 TABLET ORAL DAILY
Qty: 30 TABLET | Refills: 1 | Status: SHIPPED | OUTPATIENT
Start: 2024-06-18

## 2024-06-18 NOTE — DISCHARGE INSTRUCTIONS
Cool compresses  Continue to use benadryl or zyrtec to help with itching  Take prednisone daily until gone  Watch for worsening signs and symptoms of fevers, eye pain drainage or worsening swelling  Return to the ER if symptoms worsen

## 2024-06-18 NOTE — ED INITIAL ASSESSMENT (HPI)
Pt sts burning/itching to nathan eyes for the past 4 days. Sts symptoms have been increasing. Denies blurred vision, drng, or pain. Does not wear contacts.

## 2024-06-18 NOTE — TELEPHONE ENCOUNTER
Rx request for meloxicam, please review and sign off if appropriate. Thank you.    Last seen: 5/29/24  Last refill: 4/2/24 # 30 with 1 refill

## 2024-06-18 NOTE — ED PROVIDER NOTES
Patient Seen in: [facility]      History     Chief Complaint   Patient presents with    Eye Problem     Stated Complaint: itchy, burning, swollen eyes. Thinks an allergic reaction    Subjective:   The history is provided by the patient.       54 yo female with lupus and fibromyalgia presents to the  due to itchy eyes x 4 days. Started after applying a new mascara. Itching and swelling to the lower eyelids. No eye pain, drainage, visual changes. Taking benadryl without relief. No fevers or URI symptoms, no rashes elsewhere. Wears glasses, no contacts.     Objective:   Past Medical History:    Abdominal pain    Noticed a few weeks ago    Anemia    Back pain    Constipation    I don't go every day    Decorative tattoo    Several    Easy bruising    Fatigue    Been happening for awhile    Fibromyalgia    Fibromyalgia    Headache disorder    Being treated for migraines    Lupus (HCC)    Lupus (HCC)    Pain in joints    Shortness of breath    Sometimes not a regular thing    Sjogren's syndrome (HCC)    Sjogrens syndrome (HCC)    Skin blushing/flushing    I get hot flashes    Sleep apnea    Been awhile    Thyroid disease    Wears glasses              Past Surgical History:   Procedure Laterality Date    Anesth, section      Bilat salpingo-oophorect w/ omentect, total abdom hyste  2014    [facility]      , , and     Mastectomy left  2015    Mastectomy modified radical Bilateral 2015    Mastectomy right  2015    Other      partia hysterectomy    Thyroidectomy      Thyroidectomy  2012    Tonsillectomy      Total abdom hysterectomy      Partial, tube and ovaries removed                No pertinent social history.            Review of Systems   Constitutional: Negative.    HENT: Negative.     Eyes:  Negative for photophobia, pain, discharge, redness and visual disturbance.   Respiratory: Negative.     Cardiovascular: Negative.        Positive  for stated complaint: itchy, burning, swollen eyes. Thinks an allergic reaction  Other systems are as noted in HPI.  Constitutional and vital signs reviewed.      All other systems reviewed and negative except as noted above.    Physical Exam     ED Triage Vitals [06/18/24 0803]   /71   Pulse 81   Resp 16   Temp 98.3 °F (36.8 °C)   Temp src Temporal   SpO2 99 %   O2 Device None (Room air)       Current Vitals:   Vital Signs  BP: 125/71  Pulse: 81  Resp: 16  Temp: 98.3 °F (36.8 °C)  Temp src: Temporal    Oxygen Therapy  SpO2: 99 %  O2 Device: None (Room air)            Physical Exam  Vitals and nursing note reviewed.   Constitutional:       General: She is not in acute distress.     Appearance: Normal appearance. She is not toxic-appearing.   HENT:      Head: Normocephalic.      Right Ear: Tympanic membrane, ear canal and external ear normal.      Left Ear: Tympanic membrane, ear canal and external ear normal.      Nose: Nose normal.      Mouth/Throat:      Mouth: Mucous membranes are moist.   Eyes:      General:         Right eye: No discharge.         Left eye: No discharge.      Extraocular Movements: Extraocular movements intact.      Conjunctiva/sclera: Conjunctivae normal.      Pupils: Pupils are equal, round, and reactive to light.      Comments: Minimal lower eyelid erythema and edema. No fluctuance. Nontender. Mild dry skin. Upper eyelid is unremarkable. Conjunctiva is unremarkable. EOMI and nonpainful    Pulmonary:      Effort: Pulmonary effort is normal.   Musculoskeletal:         General: Normal range of motion.   Neurological:      General: No focal deficit present.      Mental Status: She is alert and oriented to person, place, and time.   Psychiatric:         Mood and Affect: Mood normal.         Behavior: Behavior normal.               ED Course   Labs Reviewed - No data to display                   MDM       Differential diagnosis includes allergic blepharitis, stye, preseptal cellulitis,  orbital cellulitis    On exam the patient is afebrile nontoxic.  Vital signs are stable.  She has minimal lower eyelid erythema and edema.  No fluctuance.  No nodules.  Nontender.  Extraocular motors intact and nonpainful.  Conjunctive is completely benign.  Upper eyelids without findings.  Clinically no concern for preseptal or orbital cellulitis.  Clinical exam is consistent with an allergic blepharitis.  Advised to throw out the mascara.  Can continue use Benadryl and/or Zyrtec.  Will prescribe prednisone.  Discussed at length with the patient at home care and strict return precautions.  All questions were answered and patient comfortable treatment plan and voiced understanding for close follow-up.                           Medical Decision Making  Problems Addressed:  Allergic dermatitis of both lower eyelids: acute illness or injury    Risk  OTC drugs.  Prescription drug management.        Disposition and Plan     Clinical Impression:  1. Allergic dermatitis of both lower eyelids         Disposition:  Discharge  6/18/2024  8:14 am    Follow-up:  Aline Gordon MD  [address] [address] [phone]          [facility]  [address] [place] [facility] [address] [phone]  Schedule an appointment as soon as possible for a visit   2          Medications Prescribed:  Discharge Medication List as of 6/18/2024  8:16 AM        START taking these medications    Details   predniSONE 20 MG Oral Tab Take 2 tablets (40 mg total) by mouth daily for 5 days., Normal, Disp-10 tablet, R-0

## 2024-07-01 RX ORDER — ESTRADIOL AND NORETHINDRONE ACETATE .5; .1 MG/1; MG/1
1 TABLET ORAL DAILY
Qty: 84 TABLET | Refills: 3 | Status: SHIPPED | OUTPATIENT
Start: 2024-07-01

## 2024-07-01 NOTE — TELEPHONE ENCOUNTER
ESTRADIOL  0.5MG 0.1MG  TAB  NORETH          Will file in chart as: ESTRADIOL-NORETHINDRONE ACET 0.5-0.1 MG Oral Tab    Sig: Take 1 tablet by mouth daily.    Original sig: TAKE 1 TABLET BY MOUTH DAILY    Disp: 84 tablet    Refills: 3    Start: 6/28/2024    Class: Normal    Non-formulary    To pharmacy: Requesting 1 year supply    Last ordered: 10 months ago (8/19/2023) by Aline Gordon MD    Last refill: 5/10/2024    Rx #: [id number]    Gynecology Medication Protocol [id number]/28/2024 09:28 PM    Mammogram in past 12 months    PASS--PENDING LAST PAP WNL--VIA MANUAL LOOKUP    Physical or Pelvic/Breast in past 12 or next 3 mos--VIA MANUAL LOOKUP      LOV 2/26/24   RTC 4 months   Filled 8/19/23 84 tabs 3 refills   Last Mammogram - NA   No future appointments.

## 2024-08-01 LAB
APPEARANCE UR: CLEAR
BACTERIA #/AREA URNS HPF: ABNORMAL /HPF
BACTERIA UR CULT: NORMAL
BILIRUB UR QL STRIP: NEGATIVE
COLOR UR: YELLOW
GLUCOSE UR QL STRIP: NEGATIVE
HGB UR QL STRIP: NEGATIVE
HYALINE CASTS #/AREA URNS LPF: ABNORMAL /LPF
KETONES UR QL STRIP: NEGATIVE
LEUKOCYTE ESTERASE UR QL STRIP: NEGATIVE
NITRITE UR QL STRIP: NEGATIVE
PH UR STRIP: 7.5 [PH] (ref 5–8)
PROT UR QL STRIP: NEGATIVE
RBC #/AREA URNS HPF: ABNORMAL /HPF
SERVICE CMNT-IMP: ABNORMAL
SP GR UR STRIP: 1.01 (ref 1–1.03)
SQUAMOUS #/AREA URNS HPF: ABNORMAL /HPF
WBC #/AREA URNS HPF: ABNORMAL /HPF

## 2024-08-08 LAB — DSDNA AB SER QL CLIF: NEGATIVE

## 2024-08-26 ENCOUNTER — OFFICE VISIT (OUTPATIENT)
Dept: INTERNAL MEDICINE CLINIC | Facility: CLINIC | Age: 54
End: 2024-08-26
Payer: COMMERCIAL

## 2024-08-26 VITALS
SYSTOLIC BLOOD PRESSURE: 124 MMHG | HEIGHT: 68 IN | DIASTOLIC BLOOD PRESSURE: 80 MMHG | HEART RATE: 76 BPM | OXYGEN SATURATION: 96 % | WEIGHT: 203.81 LBS | BODY MASS INDEX: 30.89 KG/M2 | TEMPERATURE: 98 F

## 2024-08-26 DIAGNOSIS — Z15.02 BRCA GENE MUTATION POSITIVE IN FEMALE: ICD-10-CM

## 2024-08-26 DIAGNOSIS — Z15.01 BRCA GENE MUTATION POSITIVE IN FEMALE: ICD-10-CM

## 2024-08-26 DIAGNOSIS — Z98.82 BREAST IMPLANT IN SITU: ICD-10-CM

## 2024-08-26 DIAGNOSIS — Z00.00 ROUTINE GENERAL MEDICAL EXAMINATION AT A HEALTH CARE FACILITY: Primary | ICD-10-CM

## 2024-08-26 DIAGNOSIS — Z90.13 S/P BILATERAL MASTECTOMY: ICD-10-CM

## 2024-08-26 DIAGNOSIS — Z15.09 BRCA GENE MUTATION POSITIVE IN FEMALE: ICD-10-CM

## 2024-08-26 DIAGNOSIS — Z12.4 SCREENING FOR CERVICAL CANCER: ICD-10-CM

## 2024-08-26 DIAGNOSIS — M85.852 OSTEOPENIA OF NECK OF LEFT FEMUR: ICD-10-CM

## 2024-08-26 PROCEDURE — 99396 PREV VISIT EST AGE 40-64: CPT | Performed by: INTERNAL MEDICINE

## 2024-08-26 RX ORDER — GALCANEZUMAB 120 MG/ML
1 INJECTION, SOLUTION SUBCUTANEOUS
COMMUNITY
Start: 2024-07-26

## 2024-08-26 NOTE — PATIENT INSTRUCTIONS
I recommend the following vaccines -  Annual FLU every September, October.    Stay up to date with COVID vaccines.     Please complete your fasting labs for me around March, 2025.

## 2024-08-26 NOTE — PROGRESS NOTES
Amanda Elmore is a 53 year old female.       HPI:   Patient presents for the following issues and a physical exam.   Screen for cervical cancer - due for yearly pap smear today  Sjogren's and lupus - she feels benlysta is really helping her joint pain. She is no longer experiencing the palpitations anymore. She is tolerating th einjections better than infusions.   Migraines - she has transitioned back to emgality and stopped propranolol. Her migraines are amlost totally resolved. However, her headaches returned while traveling to [place] last week and they are slowly ipmroving   Bilateral breast implants - placed in 2015. She would like to see plastic surgery for routine follow-up of them. She is not having any issues with them currently.     REVIEW OF SYSTEMS:   GENERAL HEALTH: feels well otherwise. No f/c  NEURO: denies any LH, dizzyness, LOC, falls  VISION: denies any blurred or double vision  RESPIRATORY: denies shortness of breath, cough, or congestion  CARDIOVASCULAR: denies chest pain, pressure or palpitations  GI: denies abdominal pain, constipation, diarrhea, n/v, BRBPR, melena  : no dysuria or hematuria or vaginal bleeding   PSYCH: mood is stable. Denies depression or anxiety.   EXT: denies edema      Wt Readings from Last 6 Encounters:   08/26/24 203 lb 12.8 oz (92.4 kg)   06/18/24 200 lb (90.7 kg)   02/26/24 199 lb (90.3 kg)   02/21/24 200 lb (90.7 kg)   01/08/24 202 lb 9.6 oz (91.9 kg)   10/09/23 208 lb (94.3 kg)       Allergies   Allergen Reactions    Benlysta [Belimumab] PALPITATIONS    Botox [Botulinum Toxin Type A, Cosm] HIVES    Penicillins HIVES, OTHER (SEE COMMENTS) and RASH    Adhesive Tape OTHER (SEE COMMENTS)     Blister      Ultram [Tramadol] ITCHING       Family History   Problem Relation Age of Onset    Cancer Father         carcinoid tumor    Other Father         Carcinoid - abdominal area    Cancer Mother         breast cancer    Breast Cancer Mother     Colon Polyps Mother     Cancer  Maternal Grandmother         stomach cacner    Breast Cancer Maternal Aunt     Breast Cancer Maternal Aunt     Other Maternal Aunt         her daughter had ovarian cancer -     Colon Polyps Brother       Past Medical History:    Abdominal pain    Noticed a few weeks ago    Anemia    Back pain    Constipation    I don't go every day    Decorative tattoo    Several    Easy bruising    Fatigue    Been happening for awhile    Fibromyalgia    Fibromyalgia    Headache disorder    Being treated for migraines    Lupus (HCC)    Lupus (HCC)    Pain in joints    Shortness of breath    Sometimes not a regular thing    Sjogren's syndrome (HCC)    Sjogrens syndrome (HCC)    Skin blushing/flushing    I get hot flashes    Sleep apnea    Been awhile    Thyroid disease    Wears glasses      Past Surgical History:   Procedure Laterality Date    Anesth, section      Bilat salpingo-oophorect w/ omentect, total abdom hyste  2014    [facility]      , , and     Mastectomy left  2015    Mastectomy modified radical Bilateral 2015    Mastectomy right  2015    Other      partia hysterectomy    Thyroidectomy      Thyroidectomy  2012    Tonsillectomy      Total abdom hysterectomy      Partial, tube and ovaries removed      Social History:    Social History     Socioeconomic History    Marital status:    Tobacco Use    Smoking status: Never    Smokeless tobacco: Never   Vaping Use    Vaping status: Never Used   Substance and Sexual Activity    Alcohol use: Never    Drug use: Not Currently     Types: Cannabis   Other Topics Concern    Caffeine Concern Yes     Comment: Maybe 8 oz couple days per week    Exercise No     Social Determinants of Health      [facility], [facility]    Housing Stability             EXAM:   /80 (BP Location: Right arm, Patient Position: Sitting, Cuff Size: large)   Pulse 76   Temp  98.3 °F (36.8 °C) (Temporal)   Ht 5' 8\" (1.727 m)   Wt 203 lb 12.8 oz (92.4 kg)   SpO2 96%   BMI 30.99 kg/m²   GENERAL: A&O well developed, well nourished,in no apparent distress  SKIN: no rashes,no suspicious lesions  HEENT: atraumatic,   NECK: supple, no jvd, no thyromegaly  LUNGS: clear to auscultation bilateraly, no c/w/r  CARDIO: RRR without g/m/r  GI: soft non tender nondistended no hsm bs throughout  NEURO: CN 2-12 grossly intact  PSYCH: pleasant  EXTREMITIES: no cyanosis, clubbing or edema  Pelvic - I was unable to visualize cervix so I performed bimanual exam and did feel it but patient was not comfortable with re-attempting speculum exam  EUGENE Pompa present for pelvic exam     ASSESSMENT AND PLAN:   # Lupus, Sjogren's - doing well on benlysta and hcq. Rheum is adjusting her methotrexate for leukopenia.   # Macrocytic Anemia: stable, due to inflammatory disease. continue to monitor  # Migraines: chronic, resolved with emgality. Outside of emgality propranolol 160 mg was helpful but still not as effective as emgality.   - topamax caused worsening memory, botox caused an allergic reaction. Nortriptyline 10 and 25 mg (not effective and could not tolerate higher dosing). Gabapentin 3600 mg (not effective), baclofen 10 mg TID and orphenadrine 100 mg BID did not help.  # Obese, BMI 30:  Reinforced healthy plant based nutrition, regular exercise, and practicing mindfulness. We outlined small, achievable goals. She is also on a few medications which can cause weight gain, particularly, HRT but she needs these agents for her QOL.  # Chronic Constipation: continue high fiber nutrition, hydration, and activity and stool softners.   # BRCA 1+: underwent bilateral mastectomy and salpingo-oopherectomy in 2015.   # Thyroid Cancer s/p thyroidectomy now with post-surgical hypothyroidism: cont synthroid. TSH was difficult to control on generic levothyroxine, needs brand name only. Following with endocrine.   #  Fibromyalgia: pain is stable. She prefers not to be on neuropathic agents.  Nutrition and exercise are key elements of living with fibromyalgia pain.   # Hormone Therapy: on therapy 2/2 bilateral oopherectomy. Ultimate goal is to d/c HRT but at this time quality of life >>risks of therapy.   # Osteopenia of Femoral Neck - low FRAX per DEXA in 2021. educated on dietary calcium/vit D3, weight bearing exercises and fall prevention. Repeat DEXA now.   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 Repeat in 5 years (2025)  Cervical Cancer Screening: I was unable to visualize cervix today. She will need smallest speculum and will need to be very low off table for next exam. She needs yearly pap smears as she is immunosuppressed. Her pap smear in 2023 did not include EC zone component.   Breast Cancer Screening:  bilateral mastectomy  Bone Health/Fall Prevention ([place]): see above  Vaccines:  TDAP 2020, pneumovac 23 2020. Up to date with shingrix.  cont flu and covid vaccines  Medical POA:  would be primary, resources provided        Care Team:  Rheumatology: Lindsay FOREMAN&R Aleisha Mahajan     The patient indicates understanding of these issues and agrees to the plan.  The patient is asked to return in 1 year for physical exam.   Aline Gordon MD

## 2024-11-11 ENCOUNTER — E-ADVICE (OUTPATIENT)
Dept: INTERNAL MEDICINE | Age: 54
End: 2024-11-11

## 2024-11-21 LAB
DSDNA AB SER QL CLIF: POSITIVE
DSDNA AB TITR SER CLIF: ABNORMAL TITER

## 2024-12-10 ENCOUNTER — APPOINTMENT (OUTPATIENT)
Dept: DERMATOLOGY | Age: 54
End: 2024-12-10

## 2025-01-20 RX ORDER — GALCANEZUMAB 120 MG/ML
1 INJECTION, SOLUTION SUBCUTANEOUS
Qty: 1.12 ML | Refills: 0 | OUTPATIENT
Start: 2025-01-20

## 2025-01-20 NOTE — TELEPHONE ENCOUNTER
Relayed Message to patient. Patient  states that she don't have a neurologist . Patient states that DR Gordon been prescribing RX since 07/2024 . Patient would like a call back regarding this matter .     Phone [phone]

## 2025-01-20 NOTE — TELEPHONE ENCOUNTER
EMGALITY 120 MG/ML Subcutaneous Solution Auto-injector         Sig: Inject 1 mL into the skin every 30 (thirty) days.    Disp: 1.12 mL    Refills: 0    [facility]    Start: 1/19/2025    Class: Normal    Non-formulary    Last ordered: 4 months ago (8/26/2024) by Reported External/Patient    Neurology Medications [id number]/19/2025 04:20 PM   Protocol Details In person appointment or virtual visit in the past 6 mos or appointment in next 3 mos    Medication is active on med list      To be filled at: [id number] - [address] [facility] [address], [phone], [phone]     LOV:08/26/2024  RTC:1 year   Labs:n/a   Last filled:10/24/2024  No future appointments.

## 2025-01-21 RX ORDER — GALCANEZUMAB 120 MG/ML
1 INJECTION, SOLUTION SUBCUTANEOUS
Qty: 3 ML | Refills: 3 | Status: SHIPPED | OUTPATIENT
Start: 2025-01-21

## 2025-01-21 RX ORDER — GALCANEZUMAB 120 MG/ML
1 INJECTION, SOLUTION SUBCUTANEOUS
Qty: 3 ML | Refills: 0 | OUTPATIENT
Start: 2025-01-21

## 2025-01-21 NOTE — TELEPHONE ENCOUNTER
Protocol passed  Requesting emgality  LOV: 08/26/24  RTC: 1 year  Last Relevant Labs:   Filled: 07/26/24 # with  refills    No future appointments.

## 2025-02-10 RX ORDER — SUMATRIPTAN SUCCINATE 25 MG/1
25 TABLET ORAL EVERY 2 HOUR PRN
Qty: 15 TABLET | Refills: 3 | Status: SHIPPED | OUTPATIENT
Start: 2025-02-10

## 2025-02-26 ENCOUNTER — TELEPHONE (OUTPATIENT)
Dept: INTERNAL MEDICINE CLINIC | Facility: CLINIC | Age: 55
End: 2025-02-26

## 2025-02-26 DIAGNOSIS — N64.4 BREAST PAIN, LEFT: ICD-10-CM

## 2025-02-26 DIAGNOSIS — Z98.82 BREAST IMPLANT IN SITU: Primary | ICD-10-CM

## 2025-02-26 LAB
DSDNA AB SER QL CLIF: POSITIVE
DSDNA AB TITR SER CLIF: ABNORMAL TITER

## 2025-02-26 NOTE — TELEPHONE ENCOUNTER
Pt is wanting to schedule w/ any provider regarding a new issue. Pt has breast implants but on the left side, it is hardening and causing pain for 4 days, and has been persisting.    Pt has been scheduled for 3/4 but wants to know if she can be 'squeezed in' for tomorrow morning?

## 2025-02-26 NOTE — TELEPHONE ENCOUNTER
Should keep her march 4th appointmment. I have ordered an US of left breast to look at her implant

## 2025-02-26 NOTE — TELEPHONE ENCOUNTER
Spoke with patient to triage. Patient states that the pain started on Saturday or Sunday. Pt denies any swelling, redness, warmth to touch, drainage. Pt states there is a bump she feels on her left breast that is bigger than the size of a pea. It is localized pain and doesn't radiate.     Providers schedules are booked for tomorrow. Next available is on Friday, but pt states she will be out of town Friday morning until Monday.     KS: Any recs for left breast pain? Diagnostic MMG? IC? Please advise, TY!

## 2025-02-27 NOTE — TELEPHONE ENCOUNTER
Spoke with patient to relay Dr. Gordon's recommendations. Pt v/u. No further questions/concerns.

## 2025-02-28 ENCOUNTER — HOSPITAL ENCOUNTER (OUTPATIENT)
Dept: ULTRASOUND IMAGING | Age: 55
Discharge: HOME | End: 2025-02-28
Attending: INTERNAL MEDICINE
Payer: COMMERCIAL

## 2025-02-28 DIAGNOSIS — N64.4 BREAST PAIN, LEFT: ICD-10-CM

## 2025-02-28 DIAGNOSIS — Z98.82 BREAST IMPLANT IN SITU: ICD-10-CM

## 2025-02-28 PROCEDURE — 76642 ULTRASOUND BREAST LIMITED: CPT | Performed by: INTERNAL MEDICINE

## 2025-03-04 ENCOUNTER — OFFICE VISIT (OUTPATIENT)
Dept: INTERNAL MEDICINE CLINIC | Facility: CLINIC | Age: 55
End: 2025-03-04
Payer: COMMERCIAL

## 2025-03-04 VITALS
BODY MASS INDEX: 31.4 KG/M2 | SYSTOLIC BLOOD PRESSURE: 126 MMHG | OXYGEN SATURATION: 97 % | HEIGHT: 68 IN | DIASTOLIC BLOOD PRESSURE: 76 MMHG | WEIGHT: 207.19 LBS | HEART RATE: 79 BPM | TEMPERATURE: 98 F

## 2025-03-04 DIAGNOSIS — N60.02 BREAST CYST, LEFT: Primary | ICD-10-CM

## 2025-03-04 PROCEDURE — 99214 OFFICE O/P EST MOD 30 MIN: CPT | Performed by: INTERNAL MEDICINE

## 2025-03-04 NOTE — PROGRESS NOTES
Subjective:   Amanda Elmore is a 54 year old female who presents for Breast Problem   The patient had double mastectomy 10 years ago as a prophylaxis.  The patient has been experiencing breast discomfort and would like to get referred to the plastic surgeon.  Recently breast ultrasound was done and it showed small cyst at the 6 o'clock position at the left breast.    History/Other:    Chief Complaint Reviewed and Verified  No Further Nursing Notes to   Review  Tobacco Reviewed  Allergies Reviewed  Medications Reviewed    Problem List Reviewed  Medical History Reviewed  Surgical History   Reviewed  OB Status Reviewed  Family History Reviewed  Social History   Reviewed         Tobacco:  She has never smoked tobacco.    Current Outpatient Medications   Medication Sig Dispense Refill    SUMATRIPTAN 25 MG Oral Tab TAKE 1 TABLET BY MOUTH EVERY 2  HOURS AS NEEDED FOR MIGRAINE.   MG/24 HOURS 15 tablet 3    EMGALITY 120 MG/ML Subcutaneous Solution Auto-injector Inject 1 mL into the skin every 30 (thirty) days. 3 mL 3    ESTRADIOL-NORETHINDRONE ACET 0.5-0.1 MG Oral Tab TAKE 1 TABLET BY MOUTH DAILY 84 tablet 3    BENLYSTA 200 MG/ML Subcutaneous Solution Auto-injector Inject 0.25 mg into the skin once a week.      methotrexate 2.5 MG Oral Tab Take 3 tablets (7.5 mg total) by mouth once a week.      levothyroxine 88 MCG Oral Tab Take 1 tablet (88 mcg total) by mouth daily. 90 tablet 0    Sodium Fluoride 1.1 % Dental Gel USE ONCE DAILY IN PLACE OF YOUR REGULAR TOOTHPASTE      folic acid 1 MG Oral Tab 3 tabs per day (Patient taking differently: 3 tablets (3 mg total). 3 tabs per day) 270 tablet 3    HYDROXYCHLOROQUINE SULFATE 200 MG Oral Tab TAKE 1 TABLET EVERY 12 HOURS. TAKE WITH FOOD 180 tablet 3    hydrocortisone 2.5 % External Cream Apply 1 Application topically 2 (two) times daily. Apply every morning and evening. 28 g 1    Cholecalciferol (VITAMIN D) 50 MCG (2000 UT) Oral Cap Take by mouth daily.       Multiple Vitamins-Minerals (MULTIVITAMIN ADULT OR) Take by mouth daily.           Review of Systems:  Pertinent items are noted in HPI.  A comprehensive review of systems was negative.      Objective:   /76 (BP Location: Right arm, Patient Position: Sitting, Cuff Size: adult)   Pulse 79   Temp 97.9 °F (36.6 °C) (Temporal)   Ht 5' 8\" (1.727 m)   Wt 207 lb 3.2 oz (94 kg)   SpO2 97%   BMI 31.50 kg/m²  Estimated body mass index is 31.5 kg/m² as calculated from the following:    Height as of this encounter: 5' 8\" (1.727 m).    Weight as of this encounter: 207 lb 3.2 oz (94 kg).  General condition: Not in distress.   There is a surgical scar in the neck  HEENT: Atraumatic normocephalic  No cervical or submandibular lymphadenopathy  Chest: Clear to auscultate  Heart: S1-S2 ,no murmur  Abdomen: Soft non tender, no palpable organomegaly  Neurological examination: No facial asymmetry.  No lateralizing neurological signs.  Mental state examination: Good eye to eye contact.  Normal mood and behavior.  Engaged in meaningful conversations.  In the presence of chaperone,Hannah, I examined the breast.  The patient has surgical scars in both side.  Implant was palpated.  However, did not palpate any masses.        Assessment & Plan:     1.  Breast discomfort and small breast cyst in the 6:00 area in the left breast.  Referral to plastic surgeon is made.  The patient will need follow-up ultrasound in 6 months time.    2.  Lupus -currently controlled.  Continue follow-up with rheumatologist.    3.  Class I obesity:  Weight Loss Advice :  A) Eat plant based diet and avoid ultra processed and fast foods.  B) Your portions should be a dinner plate. 1/2 should be vegetables, 1/4 lean protein (chicken, fish, turkey. Tofu), and 1/4 can be carbohydrates.   C)  Your main drink should be water rather than soda or alcohol or sweet coffees  D). Please try to exercise ( brisk walking or jogging) at least 30 minutes five times/week; and  do muscle strengthening exercises ( lifting the weight, doing push ups or yoga)  twice a week    E). It is very important to get 7-9 hours of sleep per night    4.  Status post thyroidectomy: Continue with the thyroid supplement.    No follow-ups on file.    Scout Romero MD, 3/4/2025, 8:45 AM

## 2025-05-10 NOTE — TELEPHONE ENCOUNTER
Requesting:   ESTRADIOL-NORETHINDRONE ACET 0.5-0.1 MG Oral Tab     Sig: TAKE 1 TABLET BY MOUTH DAILY    Disp: 84 tablet    Refills: 3     Protocol: failed     LOV:3/4/2025 (PRISCILA)  RTC: n/a   Labs:2/21/2025  Last filled: 3/22/2025   Future Appointments   Date Time Provider Department Center   7/1/2025  1:30 PM Chris Cai MD [facility]

## 2025-05-11 RX ORDER — ESTRADIOL AND NORETHINDRONE ACETATE .5; .1 MG/1; MG/1
1 TABLET ORAL DAILY
Qty: 84 TABLET | Refills: 0 | Status: SHIPPED | OUTPATIENT
Start: 2025-05-11

## 2025-06-04 ENCOUNTER — TELEPHONE (OUTPATIENT)
Dept: SURGERY | Facility: CLINIC | Age: 55
End: 2025-06-04

## 2025-06-04 NOTE — TELEPHONE ENCOUNTER
Contacted patient to request records from her previous surgeries be faxed to our office. Patient states she will attempt to obtain these records. She has her implant cards and can send them via [employer].   Patient confirmed that she has a history of prophylactic bilateral mastectomy and tissue expander placement in 2015. She then ultimately had permanent implants placed. She is unsure but believes these are saline implants.   Will reach back out to patient if records have not been received by next week.

## 2025-06-05 NOTE — TELEPHONE ENCOUNTER
Incoming fax from [facility] pharmacy requesting [place] to submit a 90 day supply for ESTRADIOL/NORETHINDRONE ACETATE     Placed in [place] IN-BASKET to review

## 2025-06-06 RX ORDER — ESTRADIOL AND NORETHINDRONE ACETATE .5; .1 MG/1; MG/1
1 TABLET ORAL DAILY
Qty: 84 TABLET | Refills: 0 | Status: SHIPPED | OUTPATIENT
Start: 2025-06-06

## 2025-07-01 ENCOUNTER — OFFICE VISIT (OUTPATIENT)
Facility: CLINIC | Age: 55
End: 2025-07-01
Payer: COMMERCIAL

## 2025-07-01 VITALS
TEMPERATURE: 98 F | HEIGHT: 67.32 IN | DIASTOLIC BLOOD PRESSURE: 79 MMHG | SYSTOLIC BLOOD PRESSURE: 139 MMHG | OXYGEN SATURATION: 100 % | WEIGHT: 198 LBS | RESPIRATION RATE: 16 BRPM | BODY MASS INDEX: 30.71 KG/M2 | HEART RATE: 71 BPM

## 2025-07-01 DIAGNOSIS — Z90.13 S/P BILATERAL MASTECTOMY: Primary | ICD-10-CM

## 2025-07-01 PROCEDURE — 3078F DIAST BP <80 MM HG: CPT | Performed by: SURGERY

## 2025-07-01 PROCEDURE — 3075F SYST BP GE 130 - 139MM HG: CPT | Performed by: SURGERY

## 2025-07-01 PROCEDURE — 99203 OFFICE O/P NEW LOW 30 MIN: CPT | Performed by: SURGERY

## 2025-07-01 PROCEDURE — 3008F BODY MASS INDEX DOCD: CPT | Performed by: SURGERY

## 2025-07-01 NOTE — CONSULTS
New Patient Consultation    This is the first visit for this 54 year old female referred for evaluation of her implant-based breast reconstruction.    History of Present Illness:   The patient is a 54 year old female with a history of bilateral prophylactic mastectomy and two-stage silicone implant reconstruction with style 20 800 cc implants in 2015.  The patient reports mild intermittent left breast discomfort.  This prompted an ultrasound  Which was performed last February and showed probably benign findings consistent of a hypoechoic region measuring approximately 1.1 x 0.2 x 0.8 cm with focal calcification considered to be benign.  6-month follow-up was recommended.  The patient was referred here for evaluation.    Past Medical History:      Past Medical History[1]      Past Surgical History:  Past Surgical History[2]      Medications:    Medications Ordered Prior to Encounter[3]      Allergies:    Allergies[4]      Family History:   Family History[5]      Social History:  History   Alcohol Use Never       History   Smoking Status    Never   Smokeless Tobacco    Never       History   Drug Use Unknown           Review of Systems:    General:   The patient denies, fever, chills, night sweats, fatigue, generalized weakness, change in appetite, weight loss, or weight gain.    Endocrine:   There is no history of poor/slow wound healing, weight loss/gain, fertility or hormone problems, cold intolerance, heat intolerance, excessive thirst, or thyroid disease.     Allergic/Immunologic:  There is no history of hives, hay fever, angioedema or anaphylaxis.    HEENT:    The patient denies ear pain, ear drainage, hearing loss, change in vision, double vision, cataracts, glaucoma, nasal congestion, nosebleed, hoarseness, sore throat, or swollen glands    Respiratory:   The patient denies shortness of breath, cough, bloody cough, phlegm, asthma, or wheezing    Cardiovascular:  The patient denies chest pain/pressure,  palpitations, irregular heartbeat, high blood pressure, stroke, or leg swelling    Breasts:  Patient denies breast masses, pain, change in the breast skin, skin dimpling, nipple discharge, or rash    Gastrointestinal:   There is no history of difficulty or pain with swallowing, reflux symptoms, nausea, vomiting, dark/ bloody stools, diarrhea, constipation,  change in bowel habits, or abdominal pain.     Genitourinary:  The patient denies frequent urination, needing to get up at night to urinate, urinary hesitancy or retaining urine, painful urination, urinary incontinence, decreased urine stream, blood in the urine, or vaginal/penile discharge.    Skin:   The patient denies rash, itching, skin lesions, dry skin, change in skin color or change in moles, sunburns, or sunburns with blistering.     Hematologic/Lymphatic:  The patient denies easily bruising or bleeding, persistent swollen glands or lymph nodes, bleeding disorders, blood clots, or pulmonary embolism.     Gynecologic:  The patient denies irregular menses, pelvic pain, pain with intercourse, painful menses, or pregnancy    Musculoskeletal:  The patient denies muscle aches/pain, joint pain, stiff joints, neck pain, back pain or bone pain.    Neurologic:  There is no history of migraines or severe headaches, seizure/epilepsy, speech problems, coordination problems, trembling/tremors, fainting/black outs, dizziness, memory problems, loss of sensation/numbness, problems walking, weakness, tingling or burning in hands/feet.     Psychiatric:  There is no history of abusive relationship, bipolar disorder, sleep disturbance, anxiety, depression or feeling of despair.    Physical Exam:  Vitals:    07/01/25 1351   BP: 139/79   Pulse: 71   Resp: 16   Temp: 97.6 °F (36.4 °C)    Body mass index is 30.71 kg/m².      The patient is awake, alert, and oriented.  She is a well-nourished, well-developed female who appears her stated age.  Her speech patterns and movements are  normal, and affect is appropriate.    HEENT: The head is normocephalic. The neck is supple. The thyroid is not enlarged and is without palpable masses.  The trachea is in the midline. Conjunctiva are clear, non-icteric.    Breasts: Breast show well-healed transverse scars with supra areolar extension bilaterally.  There is no nipple inversion or nipple discharge noted.  Both capsules are soft to palpation.  There are no dominant masses noted bilaterally.    Lymph Nodes:  There is no cervical, supraclavicular, or axillary lymphadenopathy appreciated.    Back: There is no vertebral column tenderness.    Skin: The skin appears normal. There are no suspicious appearing rashes or lesions.    Extremities: The extremities are without deformity, cyanosis or edema.    Impression:   The patient is a 54 year old female with a history of bilateral mastectomy and implant reconstruction now presents for evaluation of her silicone implant reconstruction.    Discussion and Plan:  The patient was reassured that the ultrasound findings are consistent with a benign etiology.  Nonetheless, and I have recommended that she proceed with her recommended 6-month follow-up. We reviewed the recommended FDA surveillance protocol for silicone implants.  Given the age of the patient's implants, I have recommended proceeding with bilateral breast implant MRI to evaluate for rupture.  We discussed continuing regular self breast examination with a plan for follow-up in 1 year or sooner if any changes are detected clinically or on imaging.  The plan was reviewed with the patient and questions were answered.           [1]   Past Medical History:   Abdominal pain    Noticed a few weeks ago    Anemia    Back pain    Constipation    I don't go every day    Decorative tattoo    Several    Easy bruising    Fatigue    Been happening for awhile    Fibromyalgia    Fibromyalgia    Headache disorder    Being treated for migraines    Lupus    Lupus    Pain in  joints    Shortness of breath    Sometimes not a regular thing    Sjogren's syndrome (HCC)    Sjogrens syndrome (HCC)    Skin blushing/flushing    I get hot flashes    Sleep apnea    Been awhile    Thyroid disease    Wears glasses   [2]   Past Surgical History:  Procedure Laterality Date    Anesth, section      Bilat salpingo-oophorect w/ omentect, total abdom hyste  2014    [facility]      1993, , and     Mastectomy left  2015    Mastectomy modified radical Bilateral 2015    Mastectomy right  2015    Other      partia hysterectomy    Thyroidectomy      Thyroidectomy  2012    Tonsillectomy      Total abdom hysterectomy      Partial, tube and ovaries removed   [3]   Current Outpatient Medications on File Prior to Visit   Medication Sig Dispense Refill    Estradiol-Norethindrone Acet 0.5-0.1 MG Oral Tab Take 1 tablet by mouth daily. 84 tablet 0    SUMATRIPTAN 25 MG Oral Tab TAKE 1 TABLET BY MOUTH EVERY 2  HOURS AS NEEDED FOR MIGRAINE.   MG/24 HOURS 15 tablet 3    EMGALITY 120 MG/ML Subcutaneous Solution Auto-injector Inject 1 mL into the skin every 30 (thirty) days. 3 mL 3    BENLYSTA 200 MG/ML Subcutaneous Solution Auto-injector Inject 0.25 mg into the skin once a week.      methotrexate 2.5 MG Oral Tab Take 3 tablets (7.5 mg total) by mouth once a week.      levothyroxine 88 MCG Oral Tab Take 1 tablet (88 mcg total) by mouth daily. 90 tablet 0    Sodium Fluoride 1.1 % Dental Gel USE ONCE DAILY IN PLACE OF YOUR REGULAR TOOTHPASTE      folic acid 1 MG Oral Tab 3 tabs per day (Patient taking differently: 3 tablets (3 mg total). 3 tabs per day) 270 tablet 3    HYDROXYCHLOROQUINE SULFATE 200 MG Oral Tab TAKE 1 TABLET EVERY 12 HOURS. TAKE WITH FOOD 180 tablet 3    hydrocortisone 2.5 % External Cream Apply 1 Application topically 2 (two) times daily. Apply every morning and evening. 28 g 1    Cholecalciferol (VITAMIN D) 50 MCG ( UT) Oral Cap Take  by mouth daily.      Multiple Vitamins-Minerals (MULTIVITAMIN ADULT OR) Take by mouth daily.       No current facility-administered medications on file prior to visit.   [4]   Allergies  Allergen Reactions    Benlysta [Belimumab] PALPITATIONS    Botox [Botulinum Toxin Type A, Cosm] HIVES    Penicillins HIVES, OTHER (SEE COMMENTS) and RASH    Adhesive Tape OTHER (SEE COMMENTS)     Blister      Ultram [Tramadol] ITCHING   [5]   Family History  Problem Relation Age of Onset    Cancer Father         carcinoid tumor    Other Father         Carcinoid - abdominal area    Cancer Mother         breast cancer    Breast Cancer Mother     Colon Polyps Mother     Cancer Maternal Grandmother         stomach cacner    Breast Cancer Maternal Aunt     Breast Cancer Maternal Aunt     Other Maternal Aunt         her daughter had ovarian cancer - June    Colon Polyps Brother

## 2025-07-10 NOTE — TELEPHONE ENCOUNTER
Placed Pre op papers in Pre op folder in POD 2.    DOS 09-    Upcoming appt. 08/30/22 no edema,  no murmurs,  regular rate and rhythm

## 2025-07-21 RX ORDER — ESTRADIOL AND NORETHINDRONE ACETATE .5; .1 MG/1; MG/1
1 TABLET ORAL DAILY
Qty: 84 TABLET | Refills: 0 | Status: SHIPPED | OUTPATIENT
Start: 2025-07-21

## 2025-07-21 NOTE — TELEPHONE ENCOUNTER
Pt states she received a msg that her 'estradiol' has been sent but it was sent to the wrong pharm. Pt says that her insurance has switched her pharm to '[facility]'. The pharmacies have been updated. Please advise.    Estradiol-Norethindrone Acet 0.5-0.1 MG Oral Tab 84 tablet     [facility], [facility]. [address] [phone], [phone] [[id number]]

## 2025-07-21 NOTE — TELEPHONE ENCOUNTER
Protocol: Failed  Last Office Visit: 3/4/25  Labs: Completed in April  Last Refill: 6/6/25 #84  Return to Clinic:   Future Appointments   Date Time Provider Department Center   7/30/2025  7:00 PM  MR RM2 (1.5T WIDE)  MRI [facility]   7/3/2026  9:30 AM Chris Cai MD [id number] Surg[facility]On[facility]

## 2025-07-30 ENCOUNTER — HOSPITAL ENCOUNTER (OUTPATIENT)
Dept: MRI IMAGING | Facility: HOSPITAL | Age: 55
Discharge: HOME | End: 2025-07-30

## 2025-07-30 DIAGNOSIS — Z90.13 S/P BILATERAL MASTECTOMY: ICD-10-CM

## 2025-07-30 PROCEDURE — 77047 MRI BREAST C- BILATERAL: CPT

## 2025-08-01 ENCOUNTER — TELEPHONE (OUTPATIENT)
Dept: SURGERY | Facility: CLINIC | Age: 55
End: 2025-08-01

## (undated) DEVICE — DRESSING PETRO 8X1IN NADH OCL BCTRST LOWPRFL XEROFORM 3% BI

## (undated) DEVICE — SUTURE MONOCRYL MTPS 4-0 PS2 27IN MONO ABS UNDYED

## (undated) DEVICE — TOWEL OR BLU 16X26IN 4PK

## (undated) DEVICE — GLOVE SURG 7 PROTEXIS LF CRM PF BEAD CUFF STRL PLISPRN 12IN

## (undated) DEVICE — BANDAGE CMPR ESMARCH 12FTX4IN TXTR FRHYD FREE PF TALC FREE

## (undated) DEVICE — SYRINGE 18GA 1.5IN 10ML REG WALL BLUNT FILL NDL CONC TIP

## (undated) DEVICE — Device

## (undated) DEVICE — GOWN SURG XL L3 NONREINFORCE SET IN SLV STRL LF DISP BLUE

## (undated) DEVICE — BLADE SURG 11 UNFRM SHARPNESS STRL SS

## (undated) DEVICE — GLOVE SURG 7.5 PROTEXIS LF CRM PF SMTH BEAD CUFF STRL

## (undated) DEVICE — SYRINGE 10ML GRAD N-PYRG DEHP-FR PVC FREE STRL MED LF DISP

## (undated) DEVICE — BANDAGE CMPR HOOK-LOCK 5YDX4IN KNIT ELASTIC UNQ LOOP LOCK

## (undated) DEVICE — GLOVE SURG 6.5 PROTEXIS LF CRM PF BEAD CUFF STRL PLISPRN

## (undated) DEVICE — SET IRR LF STRL PVC DISP

## (undated) DEVICE — GLOVE SURG 7.5 PROTEXIS LTX LIGHT BRN PF SMTH BEAD CUFF 3

## (undated) DEVICE — GLOVE SURG 8 PROTEXIS LTX LIGHT BRN PF SMTH BEAD CUFF 3 PLY

## (undated) DEVICE — SUTURE ETHILON MTPS 4-0 PS2 18IN MONO NABSB BLK 1667H

## (undated) DEVICE — CUTTER ENDO 2.5MM AGRS STRL S JNT

## (undated) DEVICE — PADDING CAST 4YDX3IN CTN SPCLST 100 DLTRL LOWPRFL HI

## (undated) DEVICE — CUP MED ALGN PLASTIC 2OZ STRL CLR

## (undated) DEVICE — TUBING SCT CLR 10FT .25IN MDVC NCDTV MALE TO MALE CNCT STRL

## (undated) DEVICE — SET XTN 3ML 21IN FEMALE LL DIST CNCT STD BORE DEHP-FR LF

## (undated) DEVICE — NEEDLE HPO 22GA 1.5IN REG WALL REG BVL LL SHLD MECH DEHP-FR

## (undated) DEVICE — SUTURE MONOCRYL MTPS 5-0 P-3 18IN MONO ABS UNDYED

## (undated) NOTE — LETTER
08/12/20        [facility]  [address] [place] [address]      Dear Galina Maya,    [facility] records indicate that you have outstanding lab work and or testing that was ordered for you and has not yet been completed: Fasting lab work - (at least 10

## (undated) NOTE — LETTER
February 3, 2021          Clara [address] [address]          Dear Galina Maya: The following are the results of your recent tests ordered by Dr. Sav Nascimento. Please review the list of test results.   Your result is the value on the left; we GFR, -American 80 >=60   CBC W/ DIFFERENTIAL   Result Value Ref Range    WBC 2.8 (L) 4.0 - 11.0 x10(3) uL    RBC 3.43 (L) 3.80 - 5.30 x10(6)uL    HGB 11.6 (L) 12.0 - 16.0 g/dL    HCT 35.7 35.0 - 48.0 %    .0 150.0 - 450.0 10(3)uL    .

## (undated) NOTE — LETTER
AUTHORIZATION FOR SURGICAL OPERATION OR OTHER PROCEDURE    1. I hereby authorize  , and [facility] staff assigned to my case to perform the following operation and/or procedure at the [facility] site:      _______________________________________________________________________________________________      _______________________________________________________________________________________________    2.  My physician has explained the nature and purpose of the operation or other procedure, possible alternative methods of treatment, the risks involved, and the possibility of complication to me.  I acknowledge that no guarantee has been made as to the result that may be obtained.  3.  I recognize that, during the course of this operation, or other procedure, unforseen conditions may necessitate additional or different procedure than those listed above.  I, therefore, further authorize and request that the above named physician, his/her physician assistants or designees perform such procedures as are, in his/her professional opinion, necessary and desirable.  4.  Any tissue or organs removed in the operation or other procedure may be disposed of by and at the discretion of the [facility] and [facility].  5.  I understand that in the event of a medical emergency, I will be transported by local paramedics to [facility] or other hospital emergency department.  6.  I certify that I have read and fully understand the above consent to operation and/or other procedure.    7.  I acknowledge that my physician has explained sedation/analgesia administration to me including the risks and benefits.  I consent to the administration of sedation/analgesia as may be necessary or desirable in the judgement of my physician.    Witness signature: ___________________________________________________ Date:  ______/______/_____                    Time:  ________ A.M.   P.M.       Patient Name:  ______________________________________________________  (please print)      Patient signature:  ___________________________________________________             Relationship to Patient:           []  Parent    Responsible person                          []  Spouse  In case of minor or                    [] Other  _____________   Incompetent name:  __________________________________________________                               (please print)      _____________      Responsible person  In case of minor or  Incompetent signature:  _______________________________________________    Statement of Physician  My signature below affirms that prior to the time of the procedure, I have explained to the patient and/or his/her guardian, the risks and benefits involved in the proposed treatment and any reasonable alternative to the proposed treatment.  I have also explained the risks and benefits involved in the refusal of the proposed treatment and have answered the patient's questions.                        Date:  ______/______/_______  Provider                      Signature:  __________________________________________________________       Time:  ___________ A.M    P.M.

## (undated) NOTE — MR AVS SNAPSHOT
After Visit Summary   5/28/2021    Elsie Lowry    MRN: [id number]           Visit Information     Date & Time  5/28/2021  8:00 AM Provider  Jacek Peterson MD [address] [facility] Dept.  Phone  [phone] (VITAMIN D) 50 MCG (2000 UT) Oral Cap Take by mouth daily. Multiple Vitamins-Minerals (MULTIVITAMIN ADULT OR) Take by mouth daily. hydrocortisone 2.5 % External Cream Apply 1 Application topically 2 (two) times daily. Apply every morning and evening. non-emergency, consider your options before heading to an ER. VIDEO VISITS  Visit face-to-face with a [facility] physician or   [facility] using your mobile device or computer   using [employer] [place].    e-VISITS  Communicate with a [facility] Physician or [facility] online.  The physician jevon

## (undated) NOTE — LETTER
AUTHORIZATION FOR SURGICAL OPERATION OR OTHER PROCEDURE    1. I hereby authorize Dr. Rasheed Daugherty, and [place] [employer] [employer] [facility] staff assigned to my case to perform the following operation and/or procedure at the [facility], [facility]:    _______________________________________________________________________________________________    Cortisone injection, right hallux  _______________________________________________________________________________________________    2. My physician has explained the nature and purpose of the operation or other procedure, possible alternative methods of treatment, the risks involved, and the possibility of complication to me. I acknowledge that no guarantee has been made as to the result that may be obtained. 3.  I recognize that, during the course of this operation, or other procedure, unforseen conditions may necessitate additional or different procedure than those listed above. I, therefore, further authorize and request that the above named physician, his/her physician assistants or designees perform such procedures as are, in his/her professional opinion, necessary and desirable. 4.  Any tissue or organs removed in the operation or other procedure may be disposed of by and at the discretion of the [facility], [facility] and [facility]. 5.  I understand that in the event of a medical emergency, I will be transported by local paramedics to [facility] or other hospital emergency department. 6.  I certify that I have read and fully understand the above consent to operation and/or other procedure. 7.  I acknowledge that my physician has explained sedation/analgesia administration to me including the risks and benefits. I consent to the administration of sedation/analgesia as may be necessary or desirable in the judgement of my physician.     Witness signature: ___________________________________________________ Date:  ______/______/_____                    Time: ________ A. M.  P.M. Patient Name:  ______________________________________________________  (please print)      Patient signature:  ___________________________________________________             Relationship to Patient:           []  Parent    Responsible person                          []  Spouse  In case of minor or                    [] Other  _____________   Incompetent name:  __________________________________________________                               (please print)      _____________      Responsible person  In case of minor or  Incompetent signature:  _______________________________________________    Statement of Physician  My signature below affirms that prior to the time of the procedure, I have explained to the patient and/or his/her guardian, the risks and benefits involved in the proposed treatment and any reasonable alternative to the proposed treatment. I have also explained the risks and benefits involved in the refusal of the proposed treatment and have answered the patient's questions.                         Date:  ______/______/_______  Provider                      Signature:  __________________________________________________________       Time:  ___________ A.M    P.M.